# Patient Record
Sex: FEMALE | Race: BLACK OR AFRICAN AMERICAN | Employment: OTHER | URBAN - METROPOLITAN AREA
[De-identification: names, ages, dates, MRNs, and addresses within clinical notes are randomized per-mention and may not be internally consistent; named-entity substitution may affect disease eponyms.]

---

## 2019-11-05 ENCOUNTER — OFFICE VISIT (OUTPATIENT)
Dept: PRIMARY CARE CLINIC | Age: 59
End: 2019-11-05
Payer: MEDICARE

## 2019-11-05 VITALS
DIASTOLIC BLOOD PRESSURE: 78 MMHG | BODY MASS INDEX: 31.25 KG/M2 | WEIGHT: 187.6 LBS | HEIGHT: 65 IN | OXYGEN SATURATION: 100 % | HEART RATE: 64 BPM | SYSTOLIC BLOOD PRESSURE: 138 MMHG

## 2019-11-05 DIAGNOSIS — G43.109 MIGRAINE WITH AURA AND WITHOUT STATUS MIGRAINOSUS, NOT INTRACTABLE: ICD-10-CM

## 2019-11-05 DIAGNOSIS — J30.89 NON-SEASONAL ALLERGIC RHINITIS, UNSPECIFIED TRIGGER: ICD-10-CM

## 2019-11-05 DIAGNOSIS — M51.16 LUMBAR DISC DISEASE WITH RADICULOPATHY: ICD-10-CM

## 2019-11-05 DIAGNOSIS — R63.5 WEIGHT GAIN: Primary | ICD-10-CM

## 2019-11-05 DIAGNOSIS — I10 HYPERTENSION: ICD-10-CM

## 2019-11-05 DIAGNOSIS — F32.5 MAJOR DEPRESSIVE DISORDER WITH SINGLE EPISODE, IN FULL REMISSION (HCC): ICD-10-CM

## 2019-11-05 DIAGNOSIS — I10 ESSENTIAL HYPERTENSION: ICD-10-CM

## 2019-11-05 DIAGNOSIS — Z23 NEED FOR INFLUENZA VACCINATION: ICD-10-CM

## 2019-11-05 DIAGNOSIS — F51.01 PRIMARY INSOMNIA: ICD-10-CM

## 2019-11-05 LAB
A/G RATIO: 2 (ref 1.1–2.2)
ALBUMIN SERPL-MCNC: 4.7 G/DL (ref 3.4–5)
ALP BLD-CCNC: 132 U/L (ref 40–129)
ALT SERPL-CCNC: 156 U/L (ref 10–40)
ANION GAP SERPL CALCULATED.3IONS-SCNC: 16 MMOL/L (ref 3–16)
AST SERPL-CCNC: 96 U/L (ref 15–37)
BACTERIA: ABNORMAL /HPF
BILIRUB SERPL-MCNC: <0.2 MG/DL (ref 0–1)
BILIRUBIN URINE: ABNORMAL
BLOOD, URINE: NEGATIVE
BUN BLDV-MCNC: 16 MG/DL (ref 7–20)
CALCIUM SERPL-MCNC: 9.9 MG/DL (ref 8.3–10.6)
CHLORIDE BLD-SCNC: 100 MMOL/L (ref 99–110)
CHOLESTEROL, TOTAL: 162 MG/DL (ref 0–199)
CLARITY: CLEAR
CO2: 23 MMOL/L (ref 21–32)
COLOR: YELLOW
COMMENT UA: ABNORMAL
CREAT SERPL-MCNC: 0.8 MG/DL (ref 0.6–1.1)
EPITHELIAL CELLS, UA: 6 /HPF (ref 0–5)
GFR AFRICAN AMERICAN: >60
GFR NON-AFRICAN AMERICAN: >60
GLOBULIN: 2.4 G/DL
GLUCOSE BLD-MCNC: 84 MG/DL (ref 70–99)
GLUCOSE URINE: NEGATIVE MG/DL
HCT VFR BLD CALC: 39.6 % (ref 36–48)
HDLC SERPL-MCNC: 87 MG/DL (ref 40–60)
HEMOGLOBIN: 12.6 G/DL (ref 12–16)
HYALINE CASTS: 9 /LPF (ref 0–8)
KETONES, URINE: NEGATIVE MG/DL
LDL CHOLESTEROL CALCULATED: 55 MG/DL
LEUKOCYTE ESTERASE, URINE: ABNORMAL
MCH RBC QN AUTO: 22.9 PG (ref 26–34)
MCHC RBC AUTO-ENTMCNC: 31.7 G/DL (ref 31–36)
MCV RBC AUTO: 72.2 FL (ref 80–100)
MICROSCOPIC EXAMINATION: YES
NITRITE, URINE: NEGATIVE
PDW BLD-RTO: 15.6 % (ref 12.4–15.4)
PH UA: 6.5 (ref 5–8)
PLATELET # BLD: 204 K/UL (ref 135–450)
PMV BLD AUTO: 10.4 FL (ref 5–10.5)
POTASSIUM SERPL-SCNC: 4.5 MMOL/L (ref 3.5–5.1)
PROTEIN UA: NEGATIVE MG/DL
RBC # BLD: 5.49 M/UL (ref 4–5.2)
RBC UA: 3 /HPF (ref 0–4)
SODIUM BLD-SCNC: 139 MMOL/L (ref 136–145)
SPECIFIC GRAVITY UA: 1.02 (ref 1–1.03)
TOTAL PROTEIN: 7.1 G/DL (ref 6.4–8.2)
TRIGL SERPL-MCNC: 101 MG/DL (ref 0–150)
TSH SERPL DL<=0.05 MIU/L-ACNC: 0.96 UIU/ML (ref 0.27–4.2)
URINE TYPE: ABNORMAL
UROBILINOGEN, URINE: 1 E.U./DL
VLDLC SERPL CALC-MCNC: 20 MG/DL
WBC # BLD: 7.4 K/UL (ref 4–11)
WBC UA: 3 /HPF (ref 0–5)
YEAST: PRESENT /HPF

## 2019-11-05 PROCEDURE — 90686 IIV4 VACC NO PRSV 0.5 ML IM: CPT | Performed by: FAMILY MEDICINE

## 2019-11-05 PROCEDURE — 99204 OFFICE O/P NEW MOD 45 MIN: CPT | Performed by: FAMILY MEDICINE

## 2019-11-05 PROCEDURE — G0008 ADMIN INFLUENZA VIRUS VAC: HCPCS | Performed by: FAMILY MEDICINE

## 2019-11-05 RX ORDER — TRAZODONE HYDROCHLORIDE 50 MG/1
50 TABLET ORAL NIGHTLY
Qty: 30 TABLET | Refills: 5 | Status: CANCELLED | OUTPATIENT
Start: 2019-11-05

## 2019-11-05 RX ORDER — ZIPRASIDONE HYDROCHLORIDE 60 MG/1
60 CAPSULE ORAL 2 TIMES DAILY WITH MEALS
Qty: 60 CAPSULE | Refills: 2 | Status: CANCELLED | OUTPATIENT
Start: 2019-11-05

## 2019-11-05 RX ORDER — IBUPROFEN 800 MG/1
TABLET ORAL
Qty: 90 TABLET | Refills: 3 | Status: SHIPPED | OUTPATIENT
Start: 2019-11-05 | End: 2020-03-02

## 2019-11-05 RX ORDER — SPIRONOLACTONE 50 MG/1
TABLET, FILM COATED ORAL
Qty: 60 TABLET | Refills: 5 | Status: SHIPPED | OUTPATIENT
Start: 2019-11-05 | End: 2020-08-19 | Stop reason: SDUPTHER

## 2019-11-05 RX ORDER — SERTRALINE HYDROCHLORIDE 100 MG/1
TABLET, FILM COATED ORAL
Qty: 60 TABLET | Refills: 5 | Status: CANCELLED | OUTPATIENT
Start: 2019-11-05

## 2019-11-05 RX ORDER — QUINAPRIL HCL AND HYDROCHLOROTHIAZIDE 20; 25 MG/1; MG/1
TABLET ORAL
Qty: 30 TABLET | Refills: 5 | Status: SHIPPED | OUTPATIENT
Start: 2019-11-05 | End: 2020-08-30 | Stop reason: SDUPTHER

## 2019-11-05 RX ORDER — TRAZODONE HYDROCHLORIDE 50 MG/1
50 TABLET ORAL NIGHTLY
Qty: 30 TABLET | Refills: 5 | Status: SHIPPED | OUTPATIENT
Start: 2019-11-05 | End: 2020-04-24

## 2019-11-05 RX ORDER — TOPIRAMATE 100 MG/1
100 TABLET, FILM COATED ORAL 2 TIMES DAILY
Qty: 180 TABLET | Refills: 1 | Status: SHIPPED | OUTPATIENT
Start: 2019-11-05 | End: 2021-03-05 | Stop reason: SDUPTHER

## 2019-11-05 RX ORDER — FLUTICASONE PROPIONATE 50 MCG
2 SPRAY, SUSPENSION (ML) NASAL DAILY
Qty: 1 BOTTLE | Refills: 5 | Status: SHIPPED | OUTPATIENT
Start: 2019-11-05 | End: 2021-03-05 | Stop reason: SDUPTHER

## 2019-11-05 ASSESSMENT — PATIENT HEALTH QUESTIONNAIRE - PHQ9
SUM OF ALL RESPONSES TO PHQ QUESTIONS 1-9: 1
2. FEELING DOWN, DEPRESSED OR HOPELESS: 1
1. LITTLE INTEREST OR PLEASURE IN DOING THINGS: 0
SUM OF ALL RESPONSES TO PHQ9 QUESTIONS 1 & 2: 1
SUM OF ALL RESPONSES TO PHQ QUESTIONS 1-9: 1

## 2019-11-05 ASSESSMENT — ENCOUNTER SYMPTOMS
SHORTNESS OF BREATH: 0
ABDOMINAL PAIN: 0
EYE PAIN: 0
SINUS PRESSURE: 0
COUGH: 0
VISUAL CHANGE: 0
CHANGE IN BOWEL HABIT: 0
NAUSEA: 0
FACIAL SWEATING: 0
PHOTOPHOBIA: 0
SORE THROAT: 0
EYE REDNESS: 0
BACK PAIN: 0
SWOLLEN GLANDS: 0
VOMITING: 0
BLURRED VISION: 0
ORTHOPNEA: 0
SCALP TENDERNESS: 0

## 2019-11-06 ENCOUNTER — TELEPHONE (OUTPATIENT)
Dept: FAMILY MEDICINE CLINIC | Age: 59
End: 2019-11-06

## 2019-11-19 ENCOUNTER — OFFICE VISIT (OUTPATIENT)
Dept: PRIMARY CARE CLINIC | Age: 59
End: 2019-11-19
Payer: MEDICARE

## 2019-11-19 VITALS
WEIGHT: 187.6 LBS | HEIGHT: 65 IN | DIASTOLIC BLOOD PRESSURE: 76 MMHG | OXYGEN SATURATION: 100 % | HEART RATE: 63 BPM | SYSTOLIC BLOOD PRESSURE: 121 MMHG | BODY MASS INDEX: 31.25 KG/M2

## 2019-11-19 DIAGNOSIS — Z11.4 SCREENING FOR HIV WITHOUT PRESENCE OF RISK FACTORS: ICD-10-CM

## 2019-11-19 DIAGNOSIS — I10 ESSENTIAL HYPERTENSION: Primary | ICD-10-CM

## 2019-11-19 DIAGNOSIS — I10 ESSENTIAL HYPERTENSION: ICD-10-CM

## 2019-11-19 DIAGNOSIS — Z11.59 NEED FOR HEPATITIS C SCREENING TEST: ICD-10-CM

## 2019-11-19 LAB — HEPATITIS C ANTIBODY INTERPRETATION: NORMAL

## 2019-11-19 PROCEDURE — 99213 OFFICE O/P EST LOW 20 MIN: CPT | Performed by: FAMILY MEDICINE

## 2019-11-19 RX ORDER — QUINAPRIL 10 MG/1
TABLET ORAL
Qty: 30 TABLET | Refills: 1 | Status: SHIPPED | OUTPATIENT
Start: 2019-11-19 | End: 2019-11-19 | Stop reason: SDUPTHER

## 2019-11-19 RX ORDER — HYDROCHLOROTHIAZIDE 12.5 MG/1
CAPSULE, GELATIN COATED ORAL
Refills: 1 | COMMUNITY
Start: 2019-08-28 | End: 2019-11-19 | Stop reason: SDUPTHER

## 2019-11-19 RX ORDER — HYDROCHLOROTHIAZIDE 12.5 MG/1
CAPSULE, GELATIN COATED ORAL
Qty: 30 CAPSULE | Refills: 1 | Status: SHIPPED | OUTPATIENT
Start: 2019-11-19 | End: 2019-11-19 | Stop reason: SDUPTHER

## 2019-11-19 RX ORDER — QUINAPRIL 10 MG/1
TABLET ORAL
Refills: 1 | COMMUNITY
Start: 2019-08-28 | End: 2019-11-19 | Stop reason: SDUPTHER

## 2019-11-19 ASSESSMENT — ENCOUNTER SYMPTOMS
BLOOD IN STOOL: 0
PHOTOPHOBIA: 0
VOMITING: 0
WHEEZING: 0
SINUS PRESSURE: 0
RHINORRHEA: 0
EYE ITCHING: 0
NAUSEA: 0
COUGH: 0
DIARRHEA: 0
CHOKING: 0
RECTAL PAIN: 0
EYE DISCHARGE: 0
EYE PAIN: 0
CHEST TIGHTNESS: 0
BACK PAIN: 0
COLOR CHANGE: 0
TROUBLE SWALLOWING: 0
EYE REDNESS: 0
SORE THROAT: 0
STRIDOR: 0
ABDOMINAL DISTENTION: 0
CONSTIPATION: 0
SHORTNESS OF BREATH: 0
APNEA: 0

## 2019-11-20 LAB
HIV AG/AB: NORMAL
HIV ANTIGEN: NORMAL
HIV-1 ANTIBODY: NORMAL
HIV-2 AB: NORMAL

## 2019-11-20 RX ORDER — HYDROCHLOROTHIAZIDE 12.5 MG/1
CAPSULE, GELATIN COATED ORAL
Qty: 90 CAPSULE | Refills: 1 | Status: SHIPPED | OUTPATIENT
Start: 2019-11-20 | End: 2020-07-24

## 2019-11-20 RX ORDER — QUINAPRIL 10 MG/1
TABLET ORAL
Qty: 90 TABLET | Refills: 1 | Status: SHIPPED | OUTPATIENT
Start: 2019-11-20 | End: 2020-05-04

## 2020-03-02 RX ORDER — IBUPROFEN 800 MG/1
TABLET ORAL
Qty: 90 TABLET | Refills: 3 | Status: SHIPPED | OUTPATIENT
Start: 2020-03-02 | End: 2020-07-30

## 2020-04-24 RX ORDER — TRAZODONE HYDROCHLORIDE 50 MG/1
TABLET ORAL
Qty: 30 TABLET | Refills: 5 | Status: SHIPPED | OUTPATIENT
Start: 2020-04-24 | End: 2020-10-27

## 2020-07-24 RX ORDER — HYDROCHLOROTHIAZIDE 12.5 MG/1
CAPSULE, GELATIN COATED ORAL
Qty: 90 CAPSULE | Refills: 1 | Status: SHIPPED | OUTPATIENT
Start: 2020-07-24 | End: 2020-08-19 | Stop reason: SDUPTHER

## 2020-07-30 RX ORDER — IBUPROFEN 800 MG/1
TABLET ORAL
Qty: 90 TABLET | Refills: 3 | Status: SHIPPED | OUTPATIENT
Start: 2020-07-30 | End: 2020-08-19 | Stop reason: SDUPTHER

## 2020-08-19 ENCOUNTER — OFFICE VISIT (OUTPATIENT)
Dept: PRIMARY CARE CLINIC | Age: 60
End: 2020-08-19
Payer: MEDICARE

## 2020-08-19 VITALS
OXYGEN SATURATION: 98 % | HEART RATE: 65 BPM | DIASTOLIC BLOOD PRESSURE: 71 MMHG | WEIGHT: 192 LBS | HEIGHT: 65 IN | TEMPERATURE: 97.2 F | SYSTOLIC BLOOD PRESSURE: 120 MMHG | BODY MASS INDEX: 31.99 KG/M2

## 2020-08-19 DIAGNOSIS — I10 ESSENTIAL HYPERTENSION: ICD-10-CM

## 2020-08-19 PROBLEM — F32.5 MAJOR DEPRESSIVE DISORDER WITH SINGLE EPISODE, IN FULL REMISSION (HCC): Status: ACTIVE | Noted: 2020-08-19

## 2020-08-19 LAB
A/G RATIO: 2 (ref 1.1–2.2)
ALBUMIN SERPL-MCNC: 4.8 G/DL (ref 3.4–5)
ALP BLD-CCNC: 120 U/L (ref 40–129)
ALT SERPL-CCNC: 91 U/L (ref 10–40)
ANION GAP SERPL CALCULATED.3IONS-SCNC: 15 MMOL/L (ref 3–16)
AST SERPL-CCNC: 54 U/L (ref 15–37)
BILIRUB SERPL-MCNC: <0.2 MG/DL (ref 0–1)
BUN BLDV-MCNC: 18 MG/DL (ref 7–20)
CALCIUM SERPL-MCNC: 10.3 MG/DL (ref 8.3–10.6)
CHLORIDE BLD-SCNC: 99 MMOL/L (ref 99–110)
CO2: 26 MMOL/L (ref 21–32)
CREAT SERPL-MCNC: 1 MG/DL (ref 0.6–1.1)
GFR AFRICAN AMERICAN: >60
GFR NON-AFRICAN AMERICAN: 57
GLOBULIN: 2.4 G/DL
GLUCOSE BLD-MCNC: 86 MG/DL (ref 70–99)
HCT VFR BLD CALC: 38 % (ref 36–48)
HEMOGLOBIN: 12.2 G/DL (ref 12–16)
MCH RBC QN AUTO: 22.9 PG (ref 26–34)
MCHC RBC AUTO-ENTMCNC: 32.2 G/DL (ref 31–36)
MCV RBC AUTO: 71 FL (ref 80–100)
PDW BLD-RTO: 15 % (ref 12.4–15.4)
PLATELET # BLD: 245 K/UL (ref 135–450)
PMV BLD AUTO: 9.7 FL (ref 5–10.5)
POTASSIUM SERPL-SCNC: 3.9 MMOL/L (ref 3.5–5.1)
RBC # BLD: 5.35 M/UL (ref 4–5.2)
SODIUM BLD-SCNC: 140 MMOL/L (ref 136–145)
TOTAL PROTEIN: 7.2 G/DL (ref 6.4–8.2)
WBC # BLD: 10.6 K/UL (ref 4–11)

## 2020-08-19 PROCEDURE — 99214 OFFICE O/P EST MOD 30 MIN: CPT | Performed by: FAMILY MEDICINE

## 2020-08-19 RX ORDER — QUINAPRIL 10 MG/1
10 TABLET ORAL NIGHTLY
Qty: 90 TABLET | Refills: 2 | Status: SHIPPED | OUTPATIENT
Start: 2020-08-19 | End: 2021-02-11

## 2020-08-19 RX ORDER — HYDROCHLOROTHIAZIDE 12.5 MG/1
CAPSULE, GELATIN COATED ORAL
Qty: 90 CAPSULE | Refills: 1 | Status: SHIPPED | OUTPATIENT
Start: 2020-08-19 | End: 2021-03-05 | Stop reason: SDUPTHER

## 2020-08-19 RX ORDER — SERTRALINE HYDROCHLORIDE 100 MG/1
TABLET, FILM COATED ORAL
Qty: 60 TABLET | Refills: 5 | Status: SHIPPED | OUTPATIENT
Start: 2020-08-19 | End: 2021-03-05 | Stop reason: SDUPTHER

## 2020-08-19 RX ORDER — IBUPROFEN 800 MG/1
TABLET ORAL
Qty: 90 TABLET | Refills: 3 | Status: SHIPPED | OUTPATIENT
Start: 2020-08-19 | End: 2021-03-05 | Stop reason: SDUPTHER

## 2020-08-19 RX ORDER — ZOSTER VACCINE RECOMBINANT, ADJUVANTED 50 MCG/0.5
0.5 KIT INTRAMUSCULAR ONCE
Qty: 0.5 ML | Refills: 0 | Status: SHIPPED | OUTPATIENT
Start: 2020-08-19 | End: 2020-08-19

## 2020-08-19 RX ORDER — SPIRONOLACTONE 50 MG/1
TABLET, FILM COATED ORAL
Qty: 60 TABLET | Refills: 5 | Status: SHIPPED | OUTPATIENT
Start: 2020-08-19 | End: 2020-08-30

## 2020-08-19 ASSESSMENT — ENCOUNTER SYMPTOMS
SINUS PRESSURE: 0
RHINORRHEA: 0
CHOKING: 0
PHOTOPHOBIA: 0
APNEA: 0
CONSTIPATION: 0
STRIDOR: 0
ABDOMINAL PAIN: 0
BLOOD IN STOOL: 0
WHEEZING: 0
EYE ITCHING: 0
BOWEL INCONTINENCE: 0
BLURRED VISION: 0
EYE DISCHARGE: 0
VOMITING: 0
COUGH: 0
TROUBLE SWALLOWING: 0
SORE THROAT: 0
SHORTNESS OF BREATH: 0
DIARRHEA: 0
COLOR CHANGE: 0
ABDOMINAL DISTENTION: 0
ORTHOPNEA: 0
EYE PAIN: 0
CHEST TIGHTNESS: 0
BACK PAIN: 0
NAUSEA: 0
RECTAL PAIN: 0
EYE REDNESS: 0

## 2020-08-19 ASSESSMENT — PATIENT HEALTH QUESTIONNAIRE - PHQ9
1. LITTLE INTEREST OR PLEASURE IN DOING THINGS: 0
SUM OF ALL RESPONSES TO PHQ QUESTIONS 1-9: 0
2. FEELING DOWN, DEPRESSED OR HOPELESS: 0
SUM OF ALL RESPONSES TO PHQ9 QUESTIONS 1 & 2: 0
SUM OF ALL RESPONSES TO PHQ QUESTIONS 1-9: 0

## 2020-08-19 NOTE — PATIENT INSTRUCTIONS
instructions adapted under license by [facility] ([facility]). If you have questions about a medical condition or this instruction, always ask your healthcare professional. [address] any warranty or liability for your use of this information.

## 2020-08-30 RX ORDER — SPIRONOLACTONE 50 MG/1
TABLET, FILM COATED ORAL
Qty: 60 TABLET | Refills: 5 | Status: SHIPPED | OUTPATIENT
Start: 2020-08-30 | End: 2021-03-05 | Stop reason: SDUPTHER

## 2020-08-31 DIAGNOSIS — R79.89 ELEVATED LFTS: ICD-10-CM

## 2020-08-31 LAB
HAV IGM SER IA-ACNC: NORMAL
HEPATITIS B CORE IGM ANTIBODY: NORMAL
HEPATITIS B SURFACE ANTIGEN INTERPRETATION: NORMAL
HEPATITIS C ANTIBODY INTERPRETATION: NORMAL

## 2020-10-27 RX ORDER — TRAZODONE HYDROCHLORIDE 50 MG/1
TABLET ORAL
Qty: 30 TABLET | Refills: 5 | Status: SHIPPED | OUTPATIENT
Start: 2020-10-27 | End: 2021-03-05 | Stop reason: SDUPTHER

## 2020-10-30 ENCOUNTER — OFFICE VISIT (OUTPATIENT)
Dept: PRIMARY CARE CLINIC | Age: 60
End: 2020-10-30
Payer: MEDICARE

## 2020-10-30 VITALS
SYSTOLIC BLOOD PRESSURE: 113 MMHG | HEIGHT: 65 IN | BODY MASS INDEX: 31.99 KG/M2 | OXYGEN SATURATION: 99 % | TEMPERATURE: 97 F | HEART RATE: 74 BPM | WEIGHT: 192 LBS | DIASTOLIC BLOOD PRESSURE: 68 MMHG

## 2020-10-30 PROCEDURE — G0008 ADMIN INFLUENZA VIRUS VAC: HCPCS | Performed by: FAMILY MEDICINE

## 2020-10-30 PROCEDURE — 90686 IIV4 VACC NO PRSV 0.5 ML IM: CPT | Performed by: FAMILY MEDICINE

## 2020-10-30 PROCEDURE — G0438 PPPS, INITIAL VISIT: HCPCS | Performed by: FAMILY MEDICINE

## 2020-10-30 RX ORDER — ZOSTER VACCINE RECOMBINANT, ADJUVANTED 50 MCG/0.5
0.5 KIT INTRAMUSCULAR ONCE
Qty: 0.5 ML | Refills: 0 | Status: SHIPPED | OUTPATIENT
Start: 2020-10-30 | End: 2020-10-30

## 2020-10-30 ASSESSMENT — LIFESTYLE VARIABLES
HAS A RELATIVE, FRIEND, DOCTOR, OR ANOTHER HEALTH PROFESSIONAL EXPRESSED CONCERN ABOUT YOUR DRINKING OR SUGGESTED YOU CUT DOWN: 0
HOW OFTEN DURING THE LAST YEAR HAVE YOU FOUND THAT YOU WERE NOT ABLE TO STOP DRINKING ONCE YOU HAD STARTED: 0
HOW MANY STANDARD DRINKS CONTAINING ALCOHOL DO YOU HAVE ON A TYPICAL DAY: 0
AUDIT TOTAL SCORE: 1
HAVE YOU OR SOMEONE ELSE BEEN INJURED AS A RESULT OF YOUR DRINKING: 0
HOW OFTEN DURING THE LAST YEAR HAVE YOU FAILED TO DO WHAT WAS NORMALLY EXPECTED FROM YOU BECAUSE OF DRINKING: 0
HOW OFTEN DO YOU HAVE A DRINK CONTAINING ALCOHOL: 1
HOW OFTEN DURING THE LAST YEAR HAVE YOU NEEDED AN ALCOHOLIC DRINK FIRST THING IN THE MORNING TO GET YOURSELF GOING AFTER A NIGHT OF HEAVY DRINKING: 0
HOW OFTEN DURING THE LAST YEAR HAVE YOU HAD A FEELING OF GUILT OR REMORSE AFTER DRINKING: 0
HOW OFTEN DO YOU HAVE SIX OR MORE DRINKS ON ONE OCCASION: 0
AUDIT-C TOTAL SCORE: 1
HOW OFTEN DURING THE LAST YEAR HAVE YOU BEEN UNABLE TO REMEMBER WHAT HAPPENED THE NIGHT BEFORE BECAUSE YOU HAD BEEN DRINKING: 0

## 2020-10-30 ASSESSMENT — PATIENT HEALTH QUESTIONNAIRE - PHQ9
SUM OF ALL RESPONSES TO PHQ QUESTIONS 1-9: 0
SUM OF ALL RESPONSES TO PHQ QUESTIONS 1-9: 0
1. LITTLE INTEREST OR PLEASURE IN DOING THINGS: 0
2. FEELING DOWN, DEPRESSED OR HOPELESS: 0
SUM OF ALL RESPONSES TO PHQ QUESTIONS 1-9: 0
SUM OF ALL RESPONSES TO PHQ9 QUESTIONS 1 & 2: 0

## 2020-10-30 NOTE — PROGRESS NOTES
Medicare Annual Wellness Visit  Name: Carlos Samano Date: 10/30/2020   MRN: <[id number]> Sex: Female   Age: 64 y.o. Ethnicity: Non-/Non    : 1960 Race: Claudeen Bonds is here for Medicare AWV    Screenings for behavioral, psychosocial and functional/safety risks, and cognitive dysfunction are all negative except as indicated below. These results, as well as other patient data from the [address] form, are documented in Flowsheets linked to this Encounter. No Known Allergies    Prior to Visit Medications    Medication Sig Taking? Authorizing Provider   traZODone (DESYREL) 50 MG tablet TAKE 1 TABLET BY MOUTH EVERY NIGHT Yes Sandrine Nguyen MD   spironolactone (ALDACTONE) 50 MG tablet TAKE 1 TABLET BY MOUTH TWICE DAILY Yes Sandrine Nguyen MD   quinapril (ACCUPRIL) 10 MG tablet Take 1 tablet by mouth nightly Yes Sandrine Nguyen MD   hydroCHLOROthiazide (MICROZIDE) 12.5 MG capsule TAKE 1 CAPSULE Tomer Snide Yes Sandrine Nguyen MD   sertraline (ZOLOFT) 100 MG tablet TAKE TWO TABLETS BY MOUTH DAILY Yes Sandrine Nguyen MD   ibuprofen (ADVIL;MOTRIN) 800 MG tablet TAKE 1 TABLET BY MOUTH THREE TIMES DAILY AS NEEDED FOR PAIN Yes Sandrine Nguyen MD   fluticasone (FLONASE) 50 MCG/ACT nasal spray 2 sprays by Nasal route daily Yes Sandrine Nguyen MD   topiramate (TOPAMAX) 100 MG tablet Take 1 tablet by mouth 2 times daily Yes Sandrine Nguyen MD   ziprasidone (GEODON) 60 MG capsule  Yes Historical Provider, MD   loratadine (CLARITIN) 10 MG tablet Take 1 tablet by mouth daily.  Yes Sandrine Nguyen MD       Past Medical History:   Diagnosis Date    Anxiety     Chronic back pain     injured back in  at work    Depression     Foot arch pain     is seen by a podiatrist    Headache(784.0)     Hearing loss of left ear     Hypertension     Osteoarthritis        Past Surgical History:   Procedure Laterality Date   Janet Curtis  at [address] benign    WISDOM TOOTH EXTRACTION         Family History   Problem Relation Age of Onset    Diabetes Mother     High Blood Pressure Mother     High Cholesterol Mother     Diabetes Sister     Heart Disease Sister     High Blood Pressure Sister     Substance Abuse Sister     Diabetes Brother     High Blood Pressure Brother     Heart Disease Brother     Substance Abuse Brother     Rheum Arthritis Neg Hx     Osteoarthritis Neg Hx     Asthma Neg Hx     Breast Cancer Neg Hx     Cancer Neg Hx     Heart Failure Neg Hx     Hypertension Neg Hx     Migraines Neg Hx     Ovarian Cancer Neg Hx     Rashes/Skin Problems Neg Hx     Seizures Neg Hx     Stroke Neg Hx     Thyroid Disease Neg Hx        CareTeam (Including outside providers/suppliers regularly involved in providing care):   Patient Care Team:  Jessica Correa MD as PCP - General (Family Medicine)  Jessica Correa MD as PCP - [facility] Empaneled Provider    Wt Readings from Last 3 Encounters:   10/30/20 192 lb (87.1 kg)   08/19/20 192 lb (87.1 kg)   11/19/19 187 lb 9.6 oz (85.1 kg)     Vitals:    10/30/20 1017   BP: 113/68   Pulse: 74   Temp: 97 °F (36.1 °C)   TempSrc: Oral   SpO2: 99%   Weight: 192 lb (87.1 kg)   Height: 5' 5\" (1.651 m)     Body mass index is 31.95 kg/m². Based upon direct observation of the patient, evaluation of cognition reveals recent and remote memory intact. Patient's complete Health Risk Assessment and screening values have been reviewed and are found in Flowsheets. The following problems were reviewed today and where indicated follow up appointments were made and/or referrals ordered. Positive Risk Factor Screenings with Interventions:     General Health and ACP:  General  In general, how would you say your health is?: (!) Poor  In the past 7 days, have you experienced any of the following?  New or Increased Pain, New or Increased Fatigue, Loneliness, Social Isolation, Stress or Anger?: (!) Loneliness, Stress, Anger  Do you get the social and emotional support that you need?: Yes  Do you have a Living Will?: (!) No  Advance Directives     Power of  Living Will ACP-Advance Directive ACP-Power of     Not on File Filed on 03/13/13 [address] [facility] Risk Interventions:  · she has chronic lower back pain    Has a good social support system  geodon helps, exercise helps her  Has mood swings at times  No suicidal ideation    Information advanced planning/living will given    Health Habits/Nutrition:  Health Habits/Nutrition  Do you exercise for at least 20 minutes 2-3 times per week?: Yes  Have you lost any weight without trying in the past 3 months?: No  Do you eat fewer than 2 meals per day?: No  Have you seen a dentist within the past year?: (!) No  Body mass index: (!) 31.95  Health Habits/Nutrition Interventions:  · Inadequate physical activity:  patient agrees to exercise for at least 150 minutes/week  · Dental exam overdue:  patient encouraged to make appointment with his/her dentist    Hearing/Vision:  No exam data present  Hearing/Vision  Do you or your family notice any trouble with your hearing?: (!) Yes  Do you have difficulty driving, watching TV, or doing any of your daily activities because of your eyesight?: (!) Yes  Have you had an eye exam within the past year?: (!) No  Hearing/Vision Interventions:  · Hearing concerns:  previous testing , has otc hearing appliance which works  · Vision concerns:  she sees eye specialist, near sighted    Personalized Preventive Plan   Current Health Maintenance Status  Immunization History   Administered Date(s) Administered    Influenza, Intradermal, Preservative free 12/19/2012, 10/02/2013, 12/10/2014, 11/11/2015    Influenza, Quadv, IM, PF (6 mo and older Fluzone, Flulaval, Fluarix, and 3 yrs and older Afluria) 11/05/2019    Pneumococcal Polysaccharide ([id number]) 12/19/2012    Tdap (Boostrix, Adacel) 12/19/2012        Health Maintenance   Topic Date Due    Shingles Vaccine (1 of 2) 09/25/2012    Cervical cancer screen  11/24/2018    Annual Wellness Visit (AWV)  08/18/2019    Flu vaccine (1) 09/01/2020    Potassium monitoring  08/19/2021    Creatinine monitoring  08/19/2021    Breast cancer screen  10/01/2022    DTaP/Tdap/Td vaccine (2 - Td) 12/19/2022    Colon cancer screen colonoscopy  06/13/2023    Lipid screen  11/05/2024    Hepatitis C screen  Completed    HIV screen  Completed    Hepatitis A vaccine  Aged Out    Hepatitis B vaccine  Aged Out    Hib vaccine  Aged Out    Meningococcal (ACWY) vaccine  Aged Out    Pneumococcal 0-64 years Vaccine  Aged Out     Recommendations for [place] Due: see orders and patient instructions/AVS.  . Recommended screening schedule for the next 5-10 years is provided to the patient in written form: see Patient Instructions/AVS.    There are no diagnoses linked to this encounter.

## 2020-10-30 NOTE — PATIENT INSTRUCTIONS
Personalized Preventive Plan for Janeth Guillen - 10/30/2020  Medicare offers a range of preventive health benefits. Some of the tests and screenings are paid in full while other may be subject to a deductible, co-insurance, and/or copay. Some of these benefits include a comprehensive review of your medical history including lifestyle, illnesses that may run in your family, and various assessments and screenings as appropriate. After reviewing your medical record and screening and assessments performed today your provider may have ordered immunizations, labs, imaging, and/or referrals for you. A list of these orders (if applicable) as well as your Preventive Care list are included within your After Visit Summary for your review. Other Preventive Recommendations:    · A preventive eye exam performed by an eye specialist is recommended every 1-2 years to screen for glaucoma; cataracts, macular degeneration, and other eye disorders. · A preventive dental visit is recommended every 6 months. · Try to get at least 150 minutes of exercise per week or 10,000 steps per day on a pedometer . · Order or download the FREE \"Exercise & Physical Activity: Your Everyday Guide\" from [employer]. Call 3-757.592.5433 or search [employer] online. · You need 7354-2901 mg of calcium and 7471-9087 IU of vitamin D per day. It is possible to meet your calcium requirement with diet alone, but a vitamin D supplement is usually necessary to meet this goal.  · When exposed to the sun, use a sunscreen that protects against both UVA and UVB radiation with an SPF of 30 or greater. Reapply every 2 to 3 hours or after sweating, drying off with a towel, or swimming. · Always wear a seat belt when traveling in a car. Always wear a helmet when riding a bicycle or motorcycle.

## 2020-11-25 ENCOUNTER — OFFICE VISIT (OUTPATIENT)
Dept: PRIMARY CARE CLINIC | Age: 60
End: 2020-11-25
Payer: MEDICARE

## 2020-11-25 VITALS
DIASTOLIC BLOOD PRESSURE: 74 MMHG | BODY MASS INDEX: 32.62 KG/M2 | OXYGEN SATURATION: 99 % | HEART RATE: 64 BPM | TEMPERATURE: 97.1 F | SYSTOLIC BLOOD PRESSURE: 118 MMHG | WEIGHT: 196 LBS

## 2020-11-25 PROCEDURE — 99213 OFFICE O/P EST LOW 20 MIN: CPT | Performed by: FAMILY MEDICINE

## 2020-11-25 ASSESSMENT — ENCOUNTER SYMPTOMS
SHORTNESS OF BREATH: 0
BLURRED VISION: 0
BACK PAIN: 1
BOWEL INCONTINENCE: 0
ABDOMINAL PAIN: 0
ORTHOPNEA: 0

## 2020-11-25 NOTE — PROGRESS NOTES
Subjective:      Patient ID: [id number] Gurrola is a 64 y.o. female. 3 month fu  Hypertension   This is a chronic problem. The current episode started more than 1 year ago. The problem is controlled. Pertinent negatives include no anxiety, blurred vision, chest pain, headaches, neck pain, orthopnea, palpitations, peripheral edema, PND, shortness of breath or sweats. Past treatments include ACE inhibitors and diuretics. The current treatment provides significant improvement. There are no compliance problems. There is no history of angina, kidney disease, CAD/MI, CVA, heart failure, left ventricular hypertrophy, PVD or retinopathy. There is no history of chronic renal disease, coarctation of the aorta, hyperaldosteronism, hypercortisolism, hyperparathyroidism, a hypertension causing med, pheochromocytoma, renovascular disease, sleep apnea or a thyroid problem. Back Pain   This is a chronic problem. The current episode started more than 1 year ago. The pain is present in the lumbar spine. The pain is at a severity of 3/10. The pain is mild. The symptoms are aggravated by bending. Pertinent negatives include no abdominal pain, bladder incontinence, bowel incontinence, chest pain, dysuria, fever, headaches, leg pain, numbness, paresis, pelvic pain, perianal numbness, tingling, weakness or weight loss. She has tried NSAIDs for the symptoms. The treatment provided moderate relief. 63 y/o female fu for htn and low back pain(takes ibuprofen which helps)    Hypertension  See hpi    Low back pain  With rad to R leg  See hpi  Injury 20101    She has depression  No suicidal ideation  Enjoys life    Review of Systems   Constitutional: Negative for fever and weight loss. Eyes: Negative for blurred vision. Respiratory: Negative for shortness of breath. Cardiovascular: Negative for chest pain, palpitations, orthopnea and PND. Gastrointestinal: Negative for abdominal pain and bowel incontinence.    Genitourinary: Negative for bladder incontinence, dysuria and pelvic pain. Musculoskeletal: Positive for back pain. Negative for neck pain. Neurological: Negative for tingling, weakness, numbness and headaches. Objective:   Physical Exam  Constitutional:       General: She is not in acute distress. Appearance: She is well-developed. She is not diaphoretic. HENT:      Head: Normocephalic and atraumatic. Right Ear: External ear normal.      Left Ear: External ear normal.      Nose: Nose normal.      Mouth/Throat:      Pharynx: No oropharyngeal exudate. Eyes:      General: No scleral icterus. Left eye: No discharge. Conjunctiva/sclera: Conjunctivae normal.      Pupils: Pupils are equal, round, and reactive to light. Neck:      Musculoskeletal: Normal range of motion and neck supple. Thyroid: No thyromegaly. Vascular: No JVD. Trachea: No tracheal deviation. Cardiovascular:      Rate and Rhythm: Normal rate and regular rhythm. Heart sounds: Normal heart sounds. No murmur. No friction rub. No gallop. Pulmonary:      Effort: Pulmonary effort is normal. No respiratory distress. Breath sounds: Normal breath sounds. No stridor. No wheezing or rales. Chest:      Chest wall: No tenderness. Abdominal:      General: Bowel sounds are normal. There is no distension. Palpations: Abdomen is soft. There is no mass. Tenderness: There is no abdominal tenderness. There is no guarding or rebound. Musculoskeletal: Normal range of motion. General: No tenderness. Lymphadenopathy:      Cervical: No cervical adenopathy. Skin:     General: Skin is warm and dry. Coloration: Skin is not pale. Findings: No erythema or rash. Neurological:      Mental Status: She is alert and oriented to person, place, and time. Cranial Nerves: No cranial nerve deficit. Coordination: Coordination normal.      Deep Tendon Reflexes: Reflexes are normal and symmetric.  Reflexes normal.   Psychiatric:         Behavior: Behavior normal.         Thought Content: Thought content normal.         Judgment: Judgment normal.     .  Lab Results   Component Value Date    CREATININE 1.0 08/19/2020    BUN 18 08/19/2020     08/19/2020    K 3.9 08/19/2020    CL 99 08/19/2020    CO2 26 08/19/2020       Lab Results   Component Value Date    CHOL 162 11/05/2019    CHOL 153 01/28/2014    CHOL 170 12/19/2012     Lab Results   Component Value Date    TRIG 101 11/05/2019    TRIG 93 01/28/2014    TRIG 73 12/19/2012     Lab Results   Component Value Date    HDL 87 (H) 11/05/2019    HDL 63 01/28/2014    HDL 67 (H) 12/19/2012     Lab Results   Component Value Date    LDLCALC 55 11/05/2019    LDLCALC 71 01/28/2014    LDLCALC 89 12/19/2012     Lab Results   Component Value Date    LABVLDL 20 11/05/2019    LABVLDL 19 01/28/2014    LABVLDL 15 12/19/2012     No results found for: CHOLHDLRATIO\  Assessment:      1. Essential hypertension  BP is at goal <140/90. Will continue current medication and low salt diet. Has  had recent renal function testing    2. Chronic midline low back pain without sciatica  Ibuprofen controls symptoms    3.  Major depressive disorder with single episode, in full remission ([address]  Good outlook on life  On geodon  zoloft  Follow up with psychi            Plan:              Sukhjinder Newlel MD

## 2021-02-11 DIAGNOSIS — I10 ESSENTIAL HYPERTENSION: ICD-10-CM

## 2021-02-11 RX ORDER — QUINAPRIL 10 MG/1
TABLET ORAL
Qty: 90 TABLET | Refills: 1 | Status: SHIPPED | OUTPATIENT
Start: 2021-02-11 | End: 2021-03-05 | Stop reason: SDUPTHER

## 2021-03-05 ENCOUNTER — OFFICE VISIT (OUTPATIENT)
Dept: PRIMARY CARE CLINIC | Age: 61
End: 2021-03-05
Payer: MEDICARE

## 2021-03-05 VITALS
BODY MASS INDEX: 32.68 KG/M2 | TEMPERATURE: 97.2 F | DIASTOLIC BLOOD PRESSURE: 83 MMHG | HEART RATE: 75 BPM | SYSTOLIC BLOOD PRESSURE: 122 MMHG | OXYGEN SATURATION: 98 % | RESPIRATION RATE: 18 BRPM | WEIGHT: 196.4 LBS

## 2021-03-05 DIAGNOSIS — J30.2 SEASONAL ALLERGIC RHINITIS, UNSPECIFIED TRIGGER: Primary | ICD-10-CM

## 2021-03-05 DIAGNOSIS — I10 ESSENTIAL HYPERTENSION: ICD-10-CM

## 2021-03-05 DIAGNOSIS — J30.2 SEASONAL ALLERGIC RHINITIS, UNSPECIFIED TRIGGER: ICD-10-CM

## 2021-03-05 DIAGNOSIS — G89.29 CHRONIC BILATERAL LOW BACK PAIN, UNSPECIFIED WHETHER SCIATICA PRESENT: ICD-10-CM

## 2021-03-05 DIAGNOSIS — F51.01 PRIMARY INSOMNIA: ICD-10-CM

## 2021-03-05 DIAGNOSIS — F32.5 MAJOR DEPRESSIVE DISORDER WITH SINGLE EPISODE, IN FULL REMISSION (HCC): ICD-10-CM

## 2021-03-05 DIAGNOSIS — G43.109 MIGRAINE WITH AURA AND WITHOUT STATUS MIGRAINOSUS, NOT INTRACTABLE: ICD-10-CM

## 2021-03-05 DIAGNOSIS — M54.50 CHRONIC BILATERAL LOW BACK PAIN, UNSPECIFIED WHETHER SCIATICA PRESENT: ICD-10-CM

## 2021-03-05 PROCEDURE — 99214 OFFICE O/P EST MOD 30 MIN: CPT | Performed by: FAMILY MEDICINE

## 2021-03-05 RX ORDER — IBUPROFEN 800 MG/1
800 TABLET ORAL EVERY 8 HOURS PRN
Qty: 270 TABLET | Refills: 1 | Status: SHIPPED | OUTPATIENT
Start: 2021-03-05 | End: 2021-09-20

## 2021-03-05 RX ORDER — QUINAPRIL 10 MG/1
10 TABLET ORAL NIGHTLY
Qty: 90 TABLET | Refills: 1 | Status: SHIPPED | OUTPATIENT
Start: 2021-03-05 | End: 2021-06-09 | Stop reason: SDUPTHER

## 2021-03-05 RX ORDER — SPIRONOLACTONE 50 MG/1
50 TABLET, FILM COATED ORAL 2 TIMES DAILY
Qty: 180 TABLET | Refills: 1 | Status: SHIPPED | OUTPATIENT
Start: 2021-03-05 | End: 2021-06-09 | Stop reason: SDUPTHER

## 2021-03-05 RX ORDER — TRAZODONE HYDROCHLORIDE 50 MG/1
50 TABLET ORAL NIGHTLY
Qty: 90 TABLET | Refills: 1 | Status: SHIPPED | OUTPATIENT
Start: 2021-03-05 | End: 2021-09-20

## 2021-03-05 RX ORDER — FLUTICASONE PROPIONATE 50 MCG
2 SPRAY, SUSPENSION (ML) NASAL DAILY
Qty: 1 BOTTLE | Refills: 5 | Status: SHIPPED | OUTPATIENT
Start: 2021-03-05 | End: 2021-03-05

## 2021-03-05 RX ORDER — HYDROCHLOROTHIAZIDE 12.5 MG/1
12.5 CAPSULE, GELATIN COATED ORAL EVERY MORNING
Qty: 90 CAPSULE | Refills: 1 | Status: SHIPPED | OUTPATIENT
Start: 2021-03-05 | End: 2021-06-09 | Stop reason: SDUPTHER

## 2021-03-05 RX ORDER — TOPIRAMATE 100 MG/1
100 TABLET, FILM COATED ORAL 2 TIMES DAILY
Qty: 180 TABLET | Refills: 1 | Status: SHIPPED | OUTPATIENT
Start: 2021-03-05 | End: 2021-05-21 | Stop reason: SDUPTHER

## 2021-03-05 RX ORDER — LORATADINE 10 MG/1
10 TABLET ORAL DAILY
Qty: 90 TABLET | Refills: 1 | Status: SHIPPED | OUTPATIENT
Start: 2021-03-05 | End: 2021-06-09

## 2021-03-05 RX ORDER — SERTRALINE HYDROCHLORIDE 100 MG/1
200 TABLET, FILM COATED ORAL DAILY
Qty: 180 TABLET | Refills: 1 | Status: SHIPPED | OUTPATIENT
Start: 2021-03-05 | End: 2021-09-20

## 2021-03-05 NOTE — PROGRESS NOTES
63 y/o female known HTN, seasonal allergies, chronic low back pain, migraines,  Depression and insomnia who is follow up today    Needs meds refill    Hypertension  No chest pain, headache, sob, leg edema, stroke, kidney disease     Allergic Rhinitis: Anival Ochoa is here for evaluation of possible allergic rhinitis and conjunctivitis. Patient's symptoms include clear rhinorrhea, cough, itchy eyes, itchy nose, sneezing, swelling of eyes and watery eyes. These symptoms are perennial with seasonal exacerbation. Current triggers include exposure to no known precipitant. The patient has been suffering from these symptoms for approximately 3 months. The patient has tried prescription nasal sprays with good relief of symptoms. Immunotherapy has never been tried. The patient has never had nasal polyps. The patient has no history of asthma. The patient does not suffer from frequent sinopulmonary infections. The patient has not had sinus surgery in the past. The patient has no history of eczema.       Chronic low back pain  Improved  No bladder/bowel issues    Depression  Enjoys life  No suicidal ideatin    Migraines less than one per week  Controlled    Primary insomnia  good sleep hygiene      No Known Allergies  Current Outpatient Medications   Medication Sig Dispense Refill    fluticasone (FLONASE) 50 MCG/ACT nasal spray 2 sprays by Nasal route daily 1 Bottle 5    hydroCHLOROthiazide (MICROZIDE) 12.5 MG capsule Take 1 capsule by mouth every morning 90 capsule 1    ibuprofen (ADVIL;MOTRIN) 800 MG tablet Take 1 tablet by mouth every 8 hours as needed for Pain 270 tablet 1    loratadine (CLARITIN) 10 MG tablet Take 1 tablet by mouth daily 90 tablet 1    quinapril (ACCUPRIL) 10 MG tablet Take 1 tablet by mouth nightly 90 tablet 1    sertraline (ZOLOFT) 100 MG tablet Take 2 tablets by mouth daily 180 tablet 1    spironolactone (ALDACTONE) 50 MG tablet Take 1 tablet by mouth 2 times daily 180 tablet 1    Deep Tendon Reflexes: Reflexes are normal and symmetric. Reflexes normal.   Psychiatric:         Behavior: Behavior normal.         Thought Content: Thought content normal.         Judgment: Judgment normal.       1. Essential hypertension  BP is at goal <140/90. Will continue current medication and low salt diet. Has not had recent renal function testing  Ck labs today  - hydroCHLOROthiazide (MICROZIDE) 12.5 MG capsule; Take 1 capsule by mouth every morning  Dispense: 90 capsule; Refill: 1  - quinapril (ACCUPRIL) 10 MG tablet; Take 1 tablet by mouth nightly  Dispense: 90 tablet; Refill: 1  - spironolactone (ALDACTONE) 50 MG tablet; Take 1 tablet by mouth 2 times daily  Dispense: 180 tablet; Refill: 1  - CBC; Future  - Comprehensive Metabolic Panel; Future  - Urinalysis; Future  - TSH without Reflex  - Lipid Panel    2. Non-seasonal allergic rhinitis, unspecified trigger  sxs improved  See hpi  - fluticasone (FLONASE) 50 MCG/ACT nasal spray; 2 sprays by Nasal route daily  Dispense: 1 Bottle; Refill: 5    3. Allergic rhinitis  She hpi  sxs improved  - loratadine (CLARITIN) 10 MG tablet; Take 1 tablet by mouth daily  Dispense: 90 tablet; Refill: 1    4. Chronic bilateral low back pain, unspecified whether sciatica present  Improved  Exercise  Cont. - ibuprofen (ADVIL;MOTRIN) 800 MG tablet; Take 1 tablet by mouth every 8 hours as needed for Pain  Dispense: 270 tablet; Refill: 1    5. Major depressive disorder with single episode, in full remission ([facility] [address]  No suicidal ideation  Cont ssri  - sertraline (ZOLOFT) 100 MG tablet; Take 2 tablets by mouth daily  Dispense: 180 tablet; Refill: 1    6. Migraine with aura and without status migrainosus, not intractable  Stable with  - topiramate (TOPAMAX) 100 MG tablet; Take 1 tablet by mouth 2 times daily  Dispense: 180 tablet; Refill: 1    7. Primary insomnia  Stable  Cont. with  - traZODone (DESYREL) 50 MG tablet; Take 1 tablet by mouth nightly  Dispense: 90 tablet;  Refill: 1

## 2021-03-07 RX ORDER — FLUTICASONE PROPIONATE 50 MCG
SPRAY, SUSPENSION (ML) NASAL
Qty: 48 G | Refills: 1 | Status: SHIPPED | OUTPATIENT
Start: 2021-03-07 | End: 2021-12-13 | Stop reason: SDUPTHER

## 2021-03-08 DIAGNOSIS — I10 ESSENTIAL HYPERTENSION: ICD-10-CM

## 2021-03-08 LAB
A/G RATIO: 1.7 (ref 1.1–2.2)
ALBUMIN SERPL-MCNC: 4.7 G/DL (ref 3.4–5)
ALP BLD-CCNC: 113 U/L (ref 40–129)
ALT SERPL-CCNC: 47 U/L (ref 10–40)
ANION GAP SERPL CALCULATED.3IONS-SCNC: 12 MMOL/L (ref 3–16)
AST SERPL-CCNC: 26 U/L (ref 15–37)
BILIRUB SERPL-MCNC: 0.3 MG/DL (ref 0–1)
BUN BLDV-MCNC: 18 MG/DL (ref 7–20)
CALCIUM SERPL-MCNC: 10.7 MG/DL (ref 8.3–10.6)
CHLORIDE BLD-SCNC: 100 MMOL/L (ref 99–110)
CHOLESTEROL, TOTAL: 186 MG/DL (ref 0–199)
CO2: 27 MMOL/L (ref 21–32)
CREAT SERPL-MCNC: 0.7 MG/DL (ref 0.6–1.1)
GFR AFRICAN AMERICAN: >60
GFR NON-AFRICAN AMERICAN: >60
GLOBULIN: 2.7 G/DL
GLUCOSE BLD-MCNC: 83 MG/DL (ref 70–99)
HCT VFR BLD CALC: 39.4 % (ref 36–48)
HDLC SERPL-MCNC: 61 MG/DL (ref 40–60)
HEMOGLOBIN: 12.6 G/DL (ref 12–16)
LDL CHOLESTEROL CALCULATED: 96 MG/DL
MCH RBC QN AUTO: 22.5 PG (ref 26–34)
MCHC RBC AUTO-ENTMCNC: 31.9 G/DL (ref 31–36)
MCV RBC AUTO: 70.5 FL (ref 80–100)
PDW BLD-RTO: 15.6 % (ref 12.4–15.4)
PLATELET # BLD: 277 K/UL (ref 135–450)
PMV BLD AUTO: 10.1 FL (ref 5–10.5)
POTASSIUM SERPL-SCNC: 4.4 MMOL/L (ref 3.5–5.1)
RBC # BLD: 5.58 M/UL (ref 4–5.2)
SODIUM BLD-SCNC: 139 MMOL/L (ref 136–145)
TOTAL PROTEIN: 7.4 G/DL (ref 6.4–8.2)
TRIGL SERPL-MCNC: 147 MG/DL (ref 0–150)
TSH SERPL DL<=0.05 MIU/L-ACNC: 1.97 UIU/ML (ref 0.27–4.2)
VLDLC SERPL CALC-MCNC: 29 MG/DL
WBC # BLD: 9.2 K/UL (ref 4–11)

## 2021-03-15 ENCOUNTER — IMMUNIZATION (OUTPATIENT)
Dept: PRIMARY CARE CLINIC | Age: 61
End: 2021-03-15
Payer: MEDICARE

## 2021-03-15 PROCEDURE — 0001A COVID-19, PFIZER VACCINE 30MCG/0.3ML DOSE: CPT | Performed by: FAMILY MEDICINE

## 2021-03-15 PROCEDURE — 91300 COVID-19, PFIZER VACCINE 30MCG/0.3ML DOSE: CPT | Performed by: FAMILY MEDICINE

## 2021-04-12 ENCOUNTER — IMMUNIZATION (OUTPATIENT)
Dept: PRIMARY CARE CLINIC | Age: 61
End: 2021-04-12
Payer: MEDICARE

## 2021-04-12 PROCEDURE — 91300 COVID-19, PFIZER VACCINE 30MCG/0.3ML DOSE: CPT | Performed by: FAMILY MEDICINE

## 2021-04-12 PROCEDURE — 0002A COVID-19, PFIZER VACCINE 30MCG/0.3ML DOSE: CPT | Performed by: FAMILY MEDICINE

## 2021-05-12 ENCOUNTER — TELEPHONE (OUTPATIENT)
Dept: PRIMARY CARE CLINIC | Age: 61
End: 2021-05-12

## 2021-05-12 NOTE — TELEPHONE ENCOUNTER
[facility]:  Call: Tera Montesinos: [phone]    Med request:   Pt has requested brand name Topomax which requires a PA(pt is requesting this) . pls return a call. thank you!

## 2021-05-17 NOTE — TELEPHONE ENCOUNTER
PA submitted via CMM for Topiramate 100MG tablets. Key: [id number]    Response on CMM: Drug is covered by current benefit plan. No further PA activity needed.

## 2021-05-18 ENCOUNTER — TELEPHONE (OUTPATIENT)
Dept: PRIMARY CARE CLINIC | Age: 61
End: 2021-05-18

## 2021-05-18 NOTE — TELEPHONE ENCOUNTER
Patient is requesting refill of branded Topamax  Instead of generic which she has been taking for  2 years duration.  The generic does not relieve  Her migraine headaches but the branded product  Has always helped her    She has taken ibuprofen which does not help    Request refill of branded Topamax

## 2021-05-21 DIAGNOSIS — G43.109 MIGRAINE WITH AURA AND WITHOUT STATUS MIGRAINOSUS, NOT INTRACTABLE: ICD-10-CM

## 2021-05-21 RX ORDER — TOPIRAMATE 100 MG/1
100 TABLET, FILM COATED ORAL 2 TIMES DAILY
Qty: 180 TABLET | Refills: 1 | Status: SHIPPED | OUTPATIENT
Start: 2021-05-21 | End: 2021-06-09 | Stop reason: SDUPTHER

## 2021-05-21 RX ORDER — TOPIRAMATE 100 MG
100 TABLET ORAL 2 TIMES DAILY
Qty: 180 TABLET | Refills: 1 | Status: SHIPPED | OUTPATIENT
Start: 2021-05-21 | End: 2021-05-21

## 2021-06-09 ENCOUNTER — OFFICE VISIT (OUTPATIENT)
Dept: PRIMARY CARE CLINIC | Age: 61
End: 2021-06-09
Payer: MEDICARE

## 2021-06-09 VITALS
BODY MASS INDEX: 30.95 KG/M2 | DIASTOLIC BLOOD PRESSURE: 76 MMHG | OXYGEN SATURATION: 99 % | SYSTOLIC BLOOD PRESSURE: 110 MMHG | WEIGHT: 186 LBS | HEART RATE: 68 BPM

## 2021-06-09 DIAGNOSIS — J01.10 ACUTE NON-RECURRENT FRONTAL SINUSITIS: Primary | ICD-10-CM

## 2021-06-09 DIAGNOSIS — G43.109 MIGRAINE WITH AURA AND WITHOUT STATUS MIGRAINOSUS, NOT INTRACTABLE: ICD-10-CM

## 2021-06-09 DIAGNOSIS — G43.009 MIGRAINE WITHOUT AURA AND WITHOUT STATUS MIGRAINOSUS, NOT INTRACTABLE: ICD-10-CM

## 2021-06-09 DIAGNOSIS — I10 ESSENTIAL HYPERTENSION: ICD-10-CM

## 2021-06-09 PROCEDURE — 99214 OFFICE O/P EST MOD 30 MIN: CPT | Performed by: FAMILY MEDICINE

## 2021-06-09 RX ORDER — AZITHROMYCIN 250 MG/1
TABLET, FILM COATED ORAL
Qty: 1 PACKET | Refills: 0 | Status: SHIPPED | OUTPATIENT
Start: 2021-06-09 | End: 2021-12-13 | Stop reason: SDUPTHER

## 2021-06-09 RX ORDER — QUINAPRIL 10 MG/1
10 TABLET ORAL NIGHTLY
Qty: 90 TABLET | Refills: 1 | Status: SHIPPED | OUTPATIENT
Start: 2021-06-09 | End: 2021-12-13 | Stop reason: SDUPTHER

## 2021-06-09 RX ORDER — TOPIRAMATE 100 MG/1
100 TABLET, FILM COATED ORAL 2 TIMES DAILY
Qty: 180 TABLET | Refills: 1 | Status: SHIPPED | OUTPATIENT
Start: 2021-06-09 | End: 2021-06-24

## 2021-06-09 RX ORDER — SPIRONOLACTONE 50 MG/1
50 TABLET, FILM COATED ORAL 2 TIMES DAILY
Qty: 180 TABLET | Refills: 1 | Status: SHIPPED | OUTPATIENT
Start: 2021-06-09 | End: 2021-12-03

## 2021-06-09 RX ORDER — LORATADINE 10 MG/1
10 TABLET ORAL DAILY
Qty: 30 TABLET | Refills: 1 | Status: SHIPPED | OUTPATIENT
Start: 2021-06-09 | End: 2021-10-05

## 2021-06-09 RX ORDER — HYDROCHLOROTHIAZIDE 12.5 MG/1
12.5 CAPSULE, GELATIN COATED ORAL EVERY MORNING
Qty: 90 CAPSULE | Refills: 1 | Status: SHIPPED | OUTPATIENT
Start: 2021-06-09 | End: 2021-11-03

## 2021-06-09 SDOH — ECONOMIC STABILITY: FOOD INSECURITY: WITHIN THE PAST 12 MONTHS, YOU WORRIED THAT YOUR FOOD WOULD RUN OUT BEFORE YOU GOT MONEY TO BUY MORE.: NEVER TRUE

## 2021-06-09 SDOH — ECONOMIC STABILITY: FOOD INSECURITY: WITHIN THE PAST 12 MONTHS, THE FOOD YOU BOUGHT JUST DIDN'T LAST AND YOU DIDN'T HAVE MONEY TO GET MORE.: NEVER TRUE

## 2021-06-09 ASSESSMENT — SOCIAL DETERMINANTS OF HEALTH (SDOH): HOW HARD IS IT FOR YOU TO PAY FOR THE VERY BASICS LIKE FOOD, HOUSING, MEDICAL CARE, AND HEATING?: NOT HARD AT ALL

## 2021-06-09 NOTE — PROGRESS NOTES
CC hypertension follow up  Sinus headaches  Migraine headaches  HPI  65 y/o female known htn now with recurrent sinus congestion and headches. She has had runny nose, sneezing , frontal headaches  And otc meds not helping. She stopped flonase due to sl nose bleeding. No fever. No cough. No other aggravating , relieving , or modifying factors identified    She has hypertension  No chest pain, headache, sob, leg edema, stroke, kidney disease       She has migraines  Current headaches not typical for migraines  Not unilateral, not throbbing, no aura  No nausea, vomiting      Patient Active Problem List   Diagnosis    Low back pain    Hypertension    Menopausal symptoms    Major depressive disorder with single episode, in full remission ([facility] [address]      Body mass index is 30.95 kg/m².     Wt Readings from Last 3 Encounters:   06/09/21 186 lb (84.4 kg)   03/05/21 196 lb 6.4 oz (89.1 kg)   11/25/20 196 lb (88.9 kg)      BP Readings from Last 3 Encounters:   06/09/21 110/76   03/05/21 122/83   11/25/20 118/74      Current Outpatient Medications   Medication Sig Dispense Refill    topiramate (TOPAMAX) 100 MG tablet Take 1 tablet by mouth 2 times daily 180 tablet 1    fluticasone (FLONASE) 50 MCG/ACT nasal spray SHAKE LIQUID AND USE 2 SPRAYS IN EACH NOSTRIL DAILY 48 g 1    hydroCHLOROthiazide (MICROZIDE) 12.5 MG capsule Take 1 capsule by mouth every morning 90 capsule 1    ibuprofen (ADVIL;MOTRIN) 800 MG tablet Take 1 tablet by mouth every 8 hours as needed for Pain 270 tablet 1    loratadine (CLARITIN) 10 MG tablet Take 1 tablet by mouth daily 90 tablet 1    quinapril (ACCUPRIL) 10 MG tablet Take 1 tablet by mouth nightly 90 tablet 1    sertraline (ZOLOFT) 100 MG tablet Take 2 tablets by mouth daily 180 tablet 1    spironolactone (ALDACTONE) 50 MG tablet Take 1 tablet by mouth 2 times daily 180 tablet 1    traZODone (DESYREL) 50 MG tablet Take 1 tablet by mouth nightly 90 tablet 1    ziprasidone (GEODON) 60 MG capsule        No current facility-administered medications for this visit. Immunization History   Administered Date(s) Administered    COVID-19, Pfizer, PF, 30mcg/0.3mL 03/15/2021, 04/12/2021    Influenza, Intradermal, Preservative free 12/19/2012, 10/02/2013, 12/10/2014, 11/11/2015    Influenza, Quadv, IM, PF (6 mo and older Fluzone, Flulaval, Fluarix, and 3 yrs and older Afluria) 11/05/2019, 10/30/2020    Pneumococcal Polysaccharide ([id number]) 12/19/2012    Tdap (Boostrix, Adacel) 12/19/2012        Social History     Tobacco Use    Smoking status: Never Smoker    Smokeless tobacco: Never Used   Vaping Use    Vaping Use: Never used   Substance Use Topics    Alcohol use: Yes     Alcohol/week: 0.0 standard drinks     Comment: occasionally    Drug use: No       Physical Exam  Constitutional:       General: She is not in acute distress. Appearance: She is well-developed. She is not diaphoretic. HENT:      Head: Normocephalic and atraumatic. Right Ear: External ear normal.      Left Ear: External ear normal.      Nose: Congestion and rhinorrhea present. Mouth/Throat:      Pharynx: No oropharyngeal exudate. Eyes:      General: No scleral icterus. Left eye: No discharge. Conjunctiva/sclera: Conjunctivae normal.      Pupils: Pupils are equal, round, and reactive to light. Neck:      Thyroid: No thyromegaly. Vascular: No JVD. Trachea: No tracheal deviation. Cardiovascular:      Rate and Rhythm: Normal rate and regular rhythm. Heart sounds: Normal heart sounds. No murmur heard. No friction rub. No gallop. Pulmonary:      Effort: Pulmonary effort is normal. No respiratory distress. Breath sounds: Normal breath sounds. No stridor. No wheezing or rales. Chest:      Chest wall: No tenderness. Abdominal:      General: Bowel sounds are normal. There is no distension. Palpations: Abdomen is soft. There is no mass. Tenderness:  There is no abdominal tenderness. There is no guarding or rebound. Musculoskeletal:         General: No tenderness. Normal range of motion. Cervical back: Normal range of motion and neck supple. Lymphadenopathy:      Cervical: No cervical adenopathy. Skin:     General: Skin is warm and dry. Coloration: Skin is not pale. Findings: No erythema or rash. Neurological:      Mental Status: She is alert and oriented to person, place, and time. Cranial Nerves: No cranial nerve deficit. Coordination: Coordination normal.      Deep Tendon Reflexes: Reflexes are normal and symmetric. Reflexes normal.   Psychiatric:         Behavior: Behavior normal.         Thought Content: Thought content normal.         Judgment: Judgment normal.       Lab Results   Component Value Date    CREATININE 0.7 03/08/2021    BUN 18 03/08/2021     03/08/2021    K 4.4 03/08/2021     03/08/2021    CO2 27 03/08/2021           1. Acute non-recurrent frontal sinusitis  Add anti histamine and antibiotics  Tylenol as needed      - azithromycin (ZITHROMAX) 250 MG tablet; Take 2 tabs (500 mg) on Day 1, and take 1 tab (250 mg) on days 2 through 5. Dispense: 1 packet; Refill: 0  - loratadine (CLARITIN) 10 MG tablet; Take 1 tablet by mouth daily  Dispense: 30 tablet; Refill: 1    2. Essential hypertension  bp at goal  Low salt diet, meds refill, recent labs  - spironolactone (ALDACTONE) 50 MG tablet; Take 1 tablet by mouth 2 times daily  Dispense: 180 tablet; Refill: 1  - hydroCHLOROthiazide (MICROZIDE) 12.5 MG capsule; Take 1 capsule by mouth every morning  Dispense: 90 capsule; Refill: 1  - quinapril (ACCUPRIL) 10 MG tablet; Take 1 tablet by mouth nightly  Dispense: 90 tablet; Refill: 1    3. Migraine without aura and without status migrainosus, not intractable  Stable with topamax    4.  Migraine with aura and without status migrainosus, not intractable  Stable with current treatment  - topiramate (TOPAMAX) 100 MG tablet; Take 1 tablet by mouth 2 times daily  Dispense: 180 tablet;  Refill: 1

## 2021-06-23 DIAGNOSIS — G43.109 MIGRAINE WITH AURA AND WITHOUT STATUS MIGRAINOSUS, NOT INTRACTABLE: ICD-10-CM

## 2021-06-24 RX ORDER — TOPIRAMATE 100 MG/1
TABLET, FILM COATED ORAL
Qty: 180 TABLET | Refills: 1 | Status: SHIPPED | OUTPATIENT
Start: 2021-06-24 | End: 2021-12-13 | Stop reason: SDUPTHER

## 2021-06-24 NOTE — TELEPHONE ENCOUNTER
Medication:   Requested Prescriptions     Pending Prescriptions Disp Refills    topiramate (TOPAMAX) 100 MG tablet [Pharmacy Med Name: TOPIRAMATE 100MG TABLETS] 180 tablet 1     Sig: TAKE 1 TABLET BY MOUTH TWICE DAILY        Last Filled:      Patient Phone Number: [phone] (home)     Last appt: 6/9/2021   Next appt: 12/13/2021    Last OARRS: No flowsheet data found.

## 2021-09-19 DIAGNOSIS — F51.01 PRIMARY INSOMNIA: ICD-10-CM

## 2021-09-19 DIAGNOSIS — G89.29 CHRONIC BILATERAL LOW BACK PAIN, UNSPECIFIED WHETHER SCIATICA PRESENT: ICD-10-CM

## 2021-09-19 DIAGNOSIS — M54.50 CHRONIC BILATERAL LOW BACK PAIN, UNSPECIFIED WHETHER SCIATICA PRESENT: ICD-10-CM

## 2021-09-19 DIAGNOSIS — F32.5 MAJOR DEPRESSIVE DISORDER WITH SINGLE EPISODE, IN FULL REMISSION (HCC): ICD-10-CM

## 2021-09-20 RX ORDER — IBUPROFEN 800 MG/1
800 TABLET ORAL EVERY 8 HOURS PRN
Qty: 270 TABLET | Refills: 1 | Status: SHIPPED | OUTPATIENT
Start: 2021-09-20 | End: 2021-12-13 | Stop reason: SDUPTHER

## 2021-09-20 RX ORDER — TRAZODONE HYDROCHLORIDE 50 MG/1
TABLET ORAL
Qty: 90 TABLET | Refills: 1 | Status: SHIPPED | OUTPATIENT
Start: 2021-09-20 | End: 2021-12-13 | Stop reason: SDUPTHER

## 2021-09-20 RX ORDER — SERTRALINE HYDROCHLORIDE 100 MG/1
200 TABLET, FILM COATED ORAL DAILY
Qty: 180 TABLET | Refills: 1 | Status: SHIPPED | OUTPATIENT
Start: 2021-09-20 | End: 2021-12-13 | Stop reason: SDUPTHER

## 2021-10-05 DIAGNOSIS — J01.10 ACUTE NON-RECURRENT FRONTAL SINUSITIS: ICD-10-CM

## 2021-10-05 RX ORDER — LORATADINE 10 MG/1
10 TABLET ORAL DAILY
Qty: 30 TABLET | Refills: 1 | Status: SHIPPED | OUTPATIENT
Start: 2021-10-05 | End: 2021-12-13 | Stop reason: SDUPTHER

## 2021-11-03 DIAGNOSIS — I10 ESSENTIAL HYPERTENSION: ICD-10-CM

## 2021-11-03 RX ORDER — HYDROCHLOROTHIAZIDE 12.5 MG/1
12.5 CAPSULE, GELATIN COATED ORAL EVERY MORNING
Qty: 90 CAPSULE | Refills: 1 | Status: SHIPPED | OUTPATIENT
Start: 2021-11-03 | End: 2021-12-13 | Stop reason: SDUPTHER

## 2021-11-03 NOTE — TELEPHONE ENCOUNTER
Medication:   Requested Prescriptions     Pending Prescriptions Disp Refills    hydroCHLOROthiazide (MICROZIDE) 12.5 MG capsule [Pharmacy Med Name: HYDROCHLOROTHIAZIDE 12.5MG CAPSULES] 90 capsule 1     Sig: TAKE 1 CAPSULE BY MOUTH EVERY MORNING        Last Filled:      Patient Phone Number: [phone] (home)     Last appt: 6/9/2021   Next appt: 12/13/2021    Last OARRS: No flowsheet data found.

## 2021-12-02 DIAGNOSIS — I10 ESSENTIAL HYPERTENSION: ICD-10-CM

## 2021-12-02 NOTE — TELEPHONE ENCOUNTER
Medication:   Requested Prescriptions     Pending Prescriptions Disp Refills    spironolactone (ALDACTONE) 50 MG tablet [Pharmacy Med Name: SPIRONOLACTONE 50MG TABLETS] 180 tablet 1     Sig: TAKE 1 TABLET BY MOUTH TWICE DAILY        Last Filled:      Patient Phone Number: [phone] (home)     Last appt: 6/9/2021   Next appt: 12/13/2021    Last OARRS: No flowsheet data found.

## 2021-12-03 RX ORDER — SPIRONOLACTONE 50 MG/1
TABLET, FILM COATED ORAL
Qty: 180 TABLET | Refills: 1 | Status: SHIPPED | OUTPATIENT
Start: 2021-12-03 | End: 2021-12-13 | Stop reason: SDUPTHER

## 2021-12-13 ENCOUNTER — OFFICE VISIT (OUTPATIENT)
Dept: PRIMARY CARE CLINIC | Age: 61
End: 2021-12-13
Payer: MEDICARE

## 2021-12-13 VITALS
OXYGEN SATURATION: 97 % | TEMPERATURE: 97.3 F | DIASTOLIC BLOOD PRESSURE: 83 MMHG | HEART RATE: 64 BPM | HEIGHT: 65 IN | WEIGHT: 169 LBS | SYSTOLIC BLOOD PRESSURE: 127 MMHG | BODY MASS INDEX: 28.16 KG/M2

## 2021-12-13 DIAGNOSIS — I10 ESSENTIAL HYPERTENSION: ICD-10-CM

## 2021-12-13 DIAGNOSIS — J30.2 SEASONAL ALLERGIC RHINITIS, UNSPECIFIED TRIGGER: ICD-10-CM

## 2021-12-13 DIAGNOSIS — F51.01 PRIMARY INSOMNIA: ICD-10-CM

## 2021-12-13 DIAGNOSIS — G89.29 CHRONIC BILATERAL LOW BACK PAIN, UNSPECIFIED WHETHER SCIATICA PRESENT: Primary | ICD-10-CM

## 2021-12-13 DIAGNOSIS — G43.109 MIGRAINE WITH AURA AND WITHOUT STATUS MIGRAINOSUS, NOT INTRACTABLE: ICD-10-CM

## 2021-12-13 DIAGNOSIS — F32.5 MAJOR DEPRESSIVE DISORDER WITH SINGLE EPISODE, IN FULL REMISSION (HCC): ICD-10-CM

## 2021-12-13 DIAGNOSIS — M54.50 CHRONIC BILATERAL LOW BACK PAIN, UNSPECIFIED WHETHER SCIATICA PRESENT: Primary | ICD-10-CM

## 2021-12-13 DIAGNOSIS — Z23 NEED FOR SHINGLES VACCINE: ICD-10-CM

## 2021-12-13 PROCEDURE — 99214 OFFICE O/P EST MOD 30 MIN: CPT | Performed by: FAMILY MEDICINE

## 2021-12-13 RX ORDER — ZOSTER VACCINE RECOMBINANT, ADJUVANTED 50 MCG/0.5
0.5 KIT INTRAMUSCULAR ONCE
Qty: 0.5 ML | Refills: 0 | Status: SHIPPED | OUTPATIENT
Start: 2021-12-13 | End: 2021-12-13

## 2021-12-13 RX ORDER — QUINAPRIL 10 MG/1
10 TABLET ORAL NIGHTLY
Qty: 90 TABLET | Refills: 1 | Status: SHIPPED | OUTPATIENT
Start: 2021-12-13 | End: 2022-03-14 | Stop reason: SDUPTHER

## 2021-12-13 RX ORDER — LORATADINE 10 MG/1
10 TABLET ORAL DAILY
Qty: 30 TABLET | Refills: 1 | Status: SHIPPED | OUTPATIENT
Start: 2021-12-13 | End: 2022-03-14 | Stop reason: SDUPTHER

## 2021-12-13 RX ORDER — TOPIRAMATE 100 MG/1
TABLET, FILM COATED ORAL
Qty: 180 TABLET | Refills: 1 | Status: SHIPPED | OUTPATIENT
Start: 2021-12-13 | End: 2022-03-14 | Stop reason: SDUPTHER

## 2021-12-13 RX ORDER — SERTRALINE HYDROCHLORIDE 100 MG/1
200 TABLET, FILM COATED ORAL DAILY
Qty: 180 TABLET | Refills: 1 | Status: SHIPPED | OUTPATIENT
Start: 2021-12-13 | End: 2022-03-14 | Stop reason: SDUPTHER

## 2021-12-13 RX ORDER — IBUPROFEN 800 MG/1
800 TABLET ORAL EVERY 8 HOURS PRN
Qty: 270 TABLET | Refills: 1 | Status: SHIPPED | OUTPATIENT
Start: 2021-12-13 | End: 2022-03-14 | Stop reason: SDUPTHER

## 2021-12-13 RX ORDER — SPIRONOLACTONE 50 MG/1
TABLET, FILM COATED ORAL
Qty: 180 TABLET | Refills: 1 | Status: SHIPPED | OUTPATIENT
Start: 2021-12-13 | End: 2022-03-14 | Stop reason: SDUPTHER

## 2021-12-13 RX ORDER — AZITHROMYCIN 250 MG/1
TABLET, FILM COATED ORAL
Qty: 1 PACKET | Refills: 0 | Status: SHIPPED | OUTPATIENT
Start: 2021-12-13 | End: 2021-12-13

## 2021-12-13 RX ORDER — HYDROCHLOROTHIAZIDE 12.5 MG/1
12.5 CAPSULE, GELATIN COATED ORAL EVERY MORNING
Qty: 90 CAPSULE | Refills: 1 | Status: SHIPPED | OUTPATIENT
Start: 2021-12-13 | End: 2022-03-14 | Stop reason: SDUPTHER

## 2021-12-13 RX ORDER — FLUTICASONE PROPIONATE 50 MCG
SPRAY, SUSPENSION (ML) NASAL
Qty: 48 G | Refills: 1 | Status: SHIPPED | OUTPATIENT
Start: 2021-12-13 | End: 2022-03-14 | Stop reason: SDUPTHER

## 2021-12-13 RX ORDER — ZIPRASIDONE HYDROCHLORIDE 60 MG/1
60 CAPSULE ORAL 2 TIMES DAILY WITH MEALS
Qty: 60 CAPSULE | Refills: 1 | Status: SHIPPED | OUTPATIENT
Start: 2021-12-13 | End: 2022-02-26

## 2021-12-13 RX ORDER — TRAZODONE HYDROCHLORIDE 50 MG/1
TABLET ORAL
Qty: 90 TABLET | Refills: 1 | Status: SHIPPED | OUTPATIENT
Start: 2021-12-13 | End: 2022-03-14 | Stop reason: SDUPTHER

## 2021-12-13 NOTE — PROGRESS NOTES
3 month follow up visit    Low back pian  Anxiety depression      HPI  64 y/o  Female fu htn  Insomnia, depression,  Chronic low back pain, allergies    No new issue today low back after  Over activity now improving    Needs meds refill    Hypertension     Anxiety depression  Changing psych  Needs meds refill    Insomnia  Stable    Allergies  Want meds refill          Lab Results   Component Value Date    CREATININE 0.7 03/08/2021    BUN 18 03/08/2021     03/08/2021    K 4.4 03/08/2021     03/08/2021    CO2 27 03/08/2021       1. Chronic bilateral low back pain, unspecified whether sciatica present  Recent overactivity , pain increased,  Cut back on activity, now improved  - ibuprofen (ADVIL;MOTRIN) 800 MG tablet; Take 1 tablet by mouth every 8 hours as needed for Pain  Dispense: 270 tablet; Refill: 1    2. Essential hypertension  bp at goal  Recent renal panel  - spironolactone (ALDACTONE) 50 MG tablet; TAKE 1 TABLET BY MOUTH TWICE DAILY  Dispense: 180 tablet; Refill: 1  - quinapril (ACCUPRIL) 10 MG tablet; Take 1 tablet by mouth nightly  Dispense: 90 tablet; Refill: 1  - hydroCHLOROthiazide (MICROZIDE) 12.5 MG capsule; Take 1 capsule by mouth every morning  Dispense: 90 capsule; Refill: 1    3. Seasonal allergic rhinitis, unspecified trigger  No new allergy sxs  sxs controlled  - loratadine (CLARITIN) 10 MG tablet; Take 1 tablet by mouth daily  Dispense: 30 tablet; Refill: 1  - fluticasone (FLONASE) 50 MCG/ACT nasal spray; SHAKE LIQUID AND USE 2 SPRAYS IN EACH NOSTRIL DAILY  Dispense: 48 g; Refill: 1    4. Primary insomnia  Her sxs controlled  At this time  Discussed good sleep hygiene     - traZODone (DESYREL) 50 MG tablet; TAKE 1 TABLET BY MOUTH EVERY NIGHT  Dispense: 90 tablet; Refill: 1    5. Migraine with aura and without status migrainosus, not intractable  Her sxs controlled  Will refill  - topiramate (TOPAMAX) 100 MG tablet; TAKE 1 TABLET BY MOUTH TWICE DAILY  Dispense: 180 tablet;  Refill: 1    6. Major depressive disorder with single episode, in full remission ([facility] [address]  No suicidal ideation  In process of getting new psychiatrist   - ziprasidone (GEODON) 60 MG capsule; Take 1 capsule by mouth 2 times daily (with meals)  Dispense: 60 capsule; Refill: 1  - sertraline (ZOLOFT) 100 MG tablet; Take 2 tablets by mouth daily  Dispense: 180 tablet;  Refill: 1

## 2022-02-24 DIAGNOSIS — F32.5 MAJOR DEPRESSIVE DISORDER WITH SINGLE EPISODE, IN FULL REMISSION (HCC): ICD-10-CM

## 2022-02-26 RX ORDER — ZIPRASIDONE HYDROCHLORIDE 60 MG/1
CAPSULE ORAL
Qty: 60 CAPSULE | Refills: 6 | Status: SHIPPED | OUTPATIENT
Start: 2022-02-26 | End: 2022-03-14 | Stop reason: SDUPTHER

## 2022-03-14 ENCOUNTER — OFFICE VISIT (OUTPATIENT)
Dept: PRIMARY CARE CLINIC | Age: 62
End: 2022-03-14
Payer: MEDICARE

## 2022-03-14 VITALS
SYSTOLIC BLOOD PRESSURE: 108 MMHG | DIASTOLIC BLOOD PRESSURE: 73 MMHG | OXYGEN SATURATION: 97 % | TEMPERATURE: 97.2 F | HEART RATE: 63 BPM | WEIGHT: 168 LBS | HEIGHT: 65 IN | BODY MASS INDEX: 27.99 KG/M2

## 2022-03-14 DIAGNOSIS — G89.29 CHRONIC BILATERAL LOW BACK PAIN, UNSPECIFIED WHETHER SCIATICA PRESENT: ICD-10-CM

## 2022-03-14 DIAGNOSIS — M54.50 CHRONIC BILATERAL LOW BACK PAIN, UNSPECIFIED WHETHER SCIATICA PRESENT: ICD-10-CM

## 2022-03-14 DIAGNOSIS — F32.5 MAJOR DEPRESSIVE DISORDER WITH SINGLE EPISODE, IN FULL REMISSION (HCC): ICD-10-CM

## 2022-03-14 DIAGNOSIS — J30.2 SEASONAL ALLERGIC RHINITIS, UNSPECIFIED TRIGGER: ICD-10-CM

## 2022-03-14 DIAGNOSIS — G43.109 MIGRAINE WITH AURA AND WITHOUT STATUS MIGRAINOSUS, NOT INTRACTABLE: ICD-10-CM

## 2022-03-14 DIAGNOSIS — F51.01 PRIMARY INSOMNIA: ICD-10-CM

## 2022-03-14 DIAGNOSIS — I10 ESSENTIAL HYPERTENSION: Primary | ICD-10-CM

## 2022-03-14 PROCEDURE — 99214 OFFICE O/P EST MOD 30 MIN: CPT | Performed by: FAMILY MEDICINE

## 2022-03-14 RX ORDER — SPIRONOLACTONE 50 MG/1
TABLET, FILM COATED ORAL
Qty: 180 TABLET | Refills: 1 | Status: SHIPPED | OUTPATIENT
Start: 2022-03-14 | End: 2022-06-15 | Stop reason: SDUPTHER

## 2022-03-14 RX ORDER — LORATADINE 10 MG/1
10 TABLET ORAL DAILY
Qty: 30 TABLET | Refills: 1 | Status: SHIPPED | OUTPATIENT
Start: 2022-03-14 | End: 2022-03-14

## 2022-03-14 RX ORDER — TOPIRAMATE 100 MG/1
TABLET, FILM COATED ORAL
Qty: 180 TABLET | Refills: 1 | Status: SHIPPED | OUTPATIENT
Start: 2022-03-14 | End: 2022-06-15 | Stop reason: SDUPTHER

## 2022-03-14 RX ORDER — ZIPRASIDONE HYDROCHLORIDE 60 MG/1
CAPSULE ORAL
Qty: 60 CAPSULE | Refills: 6 | Status: SHIPPED | OUTPATIENT
Start: 2022-03-14 | End: 2022-06-15 | Stop reason: SDUPTHER

## 2022-03-14 RX ORDER — IBUPROFEN 800 MG/1
800 TABLET ORAL EVERY 8 HOURS PRN
Qty: 270 TABLET | Refills: 1 | Status: SHIPPED | OUTPATIENT
Start: 2022-03-14 | End: 2022-06-08

## 2022-03-14 RX ORDER — QUINAPRIL 10 MG/1
10 TABLET ORAL NIGHTLY
Qty: 90 TABLET | Refills: 1 | Status: SHIPPED | OUTPATIENT
Start: 2022-03-14 | End: 2022-06-15 | Stop reason: SDUPTHER

## 2022-03-14 RX ORDER — LEVOCETIRIZINE DIHYDROCHLORIDE 5 MG/1
5 TABLET, FILM COATED ORAL NIGHTLY
Qty: 30 TABLET | Refills: 3 | Status: SHIPPED | OUTPATIENT
Start: 2022-03-14 | End: 2022-03-15

## 2022-03-14 RX ORDER — HYDROCHLOROTHIAZIDE 12.5 MG/1
12.5 CAPSULE, GELATIN COATED ORAL EVERY MORNING
Qty: 90 CAPSULE | Refills: 1 | Status: SHIPPED | OUTPATIENT
Start: 2022-03-14 | End: 2022-06-08

## 2022-03-14 RX ORDER — TRAZODONE HYDROCHLORIDE 50 MG/1
TABLET ORAL
Qty: 90 TABLET | Refills: 1 | Status: SHIPPED | OUTPATIENT
Start: 2022-03-14 | End: 2022-06-15 | Stop reason: SDUPTHER

## 2022-03-14 RX ORDER — FLUTICASONE PROPIONATE 50 MCG
SPRAY, SUSPENSION (ML) NASAL
Qty: 48 G | Refills: 1 | Status: SHIPPED | OUTPATIENT
Start: 2022-03-14 | End: 2022-06-15 | Stop reason: SDUPTHER

## 2022-03-14 RX ORDER — SERTRALINE HYDROCHLORIDE 100 MG/1
200 TABLET, FILM COATED ORAL DAILY
Qty: 180 TABLET | Refills: 1 | Status: SHIPPED | OUTPATIENT
Start: 2022-03-14 | End: 2022-06-15 | Stop reason: SDUPTHER

## 2022-03-14 ASSESSMENT — PATIENT HEALTH QUESTIONNAIRE - PHQ9
SUM OF ALL RESPONSES TO PHQ QUESTIONS 1-9: 7
5. POOR APPETITE OR OVEREATING: 1
SUM OF ALL RESPONSES TO PHQ9 QUESTIONS 1 & 2: 2
4. FEELING TIRED OR HAVING LITTLE ENERGY: 1
7. TROUBLE CONCENTRATING ON THINGS, SUCH AS READING THE NEWSPAPER OR WATCHING TELEVISION: 1
SUM OF ALL RESPONSES TO PHQ QUESTIONS 1-9: 7
1. LITTLE INTEREST OR PLEASURE IN DOING THINGS: 1
9. THOUGHTS THAT YOU WOULD BE BETTER OFF DEAD, OR OF HURTING YOURSELF: 0
SUM OF ALL RESPONSES TO PHQ QUESTIONS 1-9: 7
8. MOVING OR SPEAKING SO SLOWLY THAT OTHER PEOPLE COULD HAVE NOTICED. OR THE OPPOSITE, BEING SO FIGETY OR RESTLESS THAT YOU HAVE BEEN MOVING AROUND A LOT MORE THAN USUAL: 1
2. FEELING DOWN, DEPRESSED OR HOPELESS: 1
10. IF YOU CHECKED OFF ANY PROBLEMS, HOW DIFFICULT HAVE THESE PROBLEMS MADE IT FOR YOU TO DO YOUR WORK, TAKE CARE OF THINGS AT HOME, OR GET ALONG WITH OTHER PEOPLE: 1
SUM OF ALL RESPONSES TO PHQ QUESTIONS 1-9: 7
6. FEELING BAD ABOUT YOURSELF - OR THAT YOU ARE A FAILURE OR HAVE LET YOURSELF OR YOUR FAMILY DOWN: 1
3. TROUBLE FALLING OR STAYING ASLEEP: 0

## 2022-03-14 NOTE — PROGRESS NOTES
CC 3 months follow up visit    HPI 62 y/o female phd htn depression insomnia migraines allergic rhinitis      Hypertension  No chest pain, headache, sob, leg edema, stroke, kidney disease     Primary insomnia  Sleeping better now  About 7 - 8 hrs  Good sleep hygiene     Migraines  Less headaches  Less than one a month    Allergic Rhinitis: Bruce Fenton is here for evaluation of possible allergic rhinitis and conjunctivitis. Patient's symptoms include clear rhinorrhea. These symptoms are perennial with seasonal exacerbation. Current triggers include exposure to no known precipitant. The patient has been suffering from these symptoms for approximately 3 months. The patient has tried prescription nasal sprays with inadequate relief of symptoms. Immunotherapy has never been tried. The patient has never had nasal polyps. The patient has no history of asthma. The patient does not suffer from frequent sinopulmonary infections. The patient has not had sinus surgery in the past. The patient has no history of eczema. Physical Exam  Constitutional:       General: She is not in acute distress. Appearance: She is well-developed. She is not diaphoretic. HENT:      Head: Normocephalic and atraumatic. Right Ear: External ear normal.      Left Ear: External ear normal.      Nose: Nose normal.      Mouth/Throat:      Pharynx: No oropharyngeal exudate. Eyes:      General: No scleral icterus. Left eye: No discharge. Conjunctiva/sclera: Conjunctivae normal.      Pupils: Pupils are equal, round, and reactive to light. Neck:      Thyroid: No thyromegaly. Vascular: No JVD. Trachea: No tracheal deviation. Cardiovascular:      Rate and Rhythm: Normal rate and regular rhythm. Heart sounds: Normal heart sounds. No murmur heard. No friction rub. No gallop. Pulmonary:      Effort: Pulmonary effort is normal. No respiratory distress. Breath sounds: Normal breath sounds. No stridor.  No 60 MG capsule; TAKE 1 CAPSULE BY MOUTH TWICE DAILY WITH MEALS  Dispense: 60 capsule; Refill: 6  - sertraline (ZOLOFT) 100 MG tablet; Take 2 tablets by mouth daily  Dispense: 180 tablet; Refill: 1    4. Primary insomnia  Has good hygiene   No side effects  cont  - traZODone (DESYREL) 50 MG tablet; TAKE 1 TABLET BY MOUTH EVERY NIGHT  Dispense: 90 tablet; Refill: 1    5. Chronic bilateral low back pain, unspecified whether sciatica present  Less back pain  No bladder or bowel issues  - ibuprofen (ADVIL;MOTRIN) 800 MG tablet; Take 1 tablet by mouth every 8 hours as needed for Pain  Dispense: 270 tablet; Refill: 1    6. Migraine with aura and without status migrainosus, not intractable  Stable with the following, less than one migraine a month  - topiramate (TOPAMAX) 100 MG tablet; TAKE 1 TABLET BY MOUTH TWICE DAILY  Dispense: 180 tablet;  Refill: 1

## 2022-03-15 RX ORDER — LEVOCETIRIZINE DIHYDROCHLORIDE 5 MG/1
TABLET, FILM COATED ORAL
Qty: 90 TABLET | Refills: 2 | Status: SHIPPED | OUTPATIENT
Start: 2022-03-15 | End: 2022-09-21

## 2022-06-08 DIAGNOSIS — I10 ESSENTIAL HYPERTENSION: ICD-10-CM

## 2022-06-08 DIAGNOSIS — J30.2 SEASONAL ALLERGIC RHINITIS, UNSPECIFIED TRIGGER: ICD-10-CM

## 2022-06-08 DIAGNOSIS — M54.50 CHRONIC BILATERAL LOW BACK PAIN, UNSPECIFIED WHETHER SCIATICA PRESENT: ICD-10-CM

## 2022-06-08 DIAGNOSIS — G89.29 CHRONIC BILATERAL LOW BACK PAIN, UNSPECIFIED WHETHER SCIATICA PRESENT: ICD-10-CM

## 2022-06-08 RX ORDER — HYDROCHLOROTHIAZIDE 12.5 MG/1
12.5 CAPSULE, GELATIN COATED ORAL EVERY MORNING
Qty: 90 CAPSULE | Refills: 1 | Status: SHIPPED | OUTPATIENT
Start: 2022-06-08 | End: 2022-06-15 | Stop reason: SDUPTHER

## 2022-06-08 RX ORDER — LORATADINE 10 MG/1
10 TABLET ORAL DAILY
Qty: 30 TABLET | Refills: 1 | Status: SHIPPED | OUTPATIENT
Start: 2022-06-08 | End: 2022-06-15 | Stop reason: SDUPTHER

## 2022-06-08 RX ORDER — IBUPROFEN 800 MG/1
800 TABLET ORAL EVERY 8 HOURS PRN
Qty: 270 TABLET | Refills: 1 | Status: SHIPPED | OUTPATIENT
Start: 2022-06-08 | End: 2022-06-15 | Stop reason: SDUPTHER

## 2022-06-15 ENCOUNTER — OFFICE VISIT (OUTPATIENT)
Dept: PRIMARY CARE CLINIC | Age: 62
End: 2022-06-15
Payer: MEDICARE

## 2022-06-15 VITALS
TEMPERATURE: 98.1 F | WEIGHT: 166 LBS | SYSTOLIC BLOOD PRESSURE: 133 MMHG | HEIGHT: 65 IN | BODY MASS INDEX: 27.66 KG/M2 | DIASTOLIC BLOOD PRESSURE: 83 MMHG | HEART RATE: 59 BPM | OXYGEN SATURATION: 98 %

## 2022-06-15 DIAGNOSIS — J30.2 SEASONAL ALLERGIC RHINITIS, UNSPECIFIED TRIGGER: ICD-10-CM

## 2022-06-15 DIAGNOSIS — I10 ESSENTIAL HYPERTENSION: ICD-10-CM

## 2022-06-15 DIAGNOSIS — G89.29 CHRONIC BILATERAL LOW BACK PAIN, UNSPECIFIED WHETHER SCIATICA PRESENT: ICD-10-CM

## 2022-06-15 DIAGNOSIS — F51.01 PRIMARY INSOMNIA: ICD-10-CM

## 2022-06-15 DIAGNOSIS — F32.5 MAJOR DEPRESSIVE DISORDER WITH SINGLE EPISODE, IN FULL REMISSION (HCC): ICD-10-CM

## 2022-06-15 DIAGNOSIS — G43.109 MIGRAINE WITH AURA AND WITHOUT STATUS MIGRAINOSUS, NOT INTRACTABLE: ICD-10-CM

## 2022-06-15 DIAGNOSIS — L30.9 DERMATITIS: ICD-10-CM

## 2022-06-15 DIAGNOSIS — M54.50 CHRONIC BILATERAL LOW BACK PAIN, UNSPECIFIED WHETHER SCIATICA PRESENT: ICD-10-CM

## 2022-06-15 DIAGNOSIS — L30.9 DERMATITIS: Primary | ICD-10-CM

## 2022-06-15 LAB
ANION GAP SERPL CALCULATED.3IONS-SCNC: 16 MMOL/L (ref 3–16)
AST SERPL-CCNC: 17 U/L (ref 15–37)
BUN BLDV-MCNC: 18 MG/DL (ref 7–20)
CALCIUM SERPL-MCNC: 10 MG/DL (ref 8.3–10.6)
CHLORIDE BLD-SCNC: 105 MMOL/L (ref 99–110)
CHOLESTEROL, FASTING: 179 MG/DL (ref 0–199)
CO2: 20 MMOL/L (ref 21–32)
CREAT SERPL-MCNC: 0.9 MG/DL (ref 0.6–1.1)
GFR AFRICAN AMERICAN: >60
GFR NON-AFRICAN AMERICAN: >60
GLUCOSE BLD-MCNC: 81 MG/DL (ref 70–99)
HCT VFR BLD CALC: 38 % (ref 36–48)
HDLC SERPL-MCNC: 74 MG/DL (ref 40–60)
HEMOGLOBIN: 11.9 G/DL (ref 12–16)
LDL CHOLESTEROL CALCULATED: 89 MG/DL
MCH RBC QN AUTO: 22.9 PG (ref 26–34)
MCHC RBC AUTO-ENTMCNC: 31.4 G/DL (ref 31–36)
MCV RBC AUTO: 73 FL (ref 80–100)
PDW BLD-RTO: 16.4 % (ref 12.4–15.4)
PLATELET # BLD: 260 K/UL (ref 135–450)
PMV BLD AUTO: 10.1 FL (ref 5–10.5)
POTASSIUM SERPL-SCNC: 3.9 MMOL/L (ref 3.5–5.1)
RBC # BLD: 5.21 M/UL (ref 4–5.2)
SODIUM BLD-SCNC: 141 MMOL/L (ref 136–145)
TRIGLYCERIDE, FASTING: 80 MG/DL (ref 0–150)
VLDLC SERPL CALC-MCNC: 16 MG/DL
WBC # BLD: 9.8 K/UL (ref 4–11)

## 2022-06-15 PROCEDURE — 99214 OFFICE O/P EST MOD 30 MIN: CPT | Performed by: FAMILY MEDICINE

## 2022-06-15 RX ORDER — TRAZODONE HYDROCHLORIDE 50 MG/1
TABLET ORAL
Qty: 90 TABLET | Refills: 1 | Status: SHIPPED | OUTPATIENT
Start: 2022-06-15

## 2022-06-15 RX ORDER — TRIAMCINOLONE ACETONIDE 1 MG/G
CREAM TOPICAL
Qty: 28.8 G | Refills: 1 | Status: SHIPPED | OUTPATIENT
Start: 2022-06-15 | End: 2022-09-21

## 2022-06-15 RX ORDER — QUINAPRIL 10 MG/1
10 TABLET ORAL NIGHTLY
Qty: 90 TABLET | Refills: 1 | Status: SHIPPED | OUTPATIENT
Start: 2022-06-15 | End: 2022-09-08

## 2022-06-15 RX ORDER — IBUPROFEN 800 MG/1
800 TABLET ORAL EVERY 8 HOURS PRN
Qty: 270 TABLET | Refills: 1 | Status: SHIPPED | OUTPATIENT
Start: 2022-06-15 | End: 2022-09-21

## 2022-06-15 RX ORDER — LORATADINE 10 MG/1
10 TABLET ORAL DAILY
Qty: 30 TABLET | Refills: 1 | Status: SHIPPED | OUTPATIENT
Start: 2022-06-15

## 2022-06-15 RX ORDER — SPIRONOLACTONE 50 MG/1
TABLET, FILM COATED ORAL
Qty: 180 TABLET | Refills: 1 | Status: SHIPPED | OUTPATIENT
Start: 2022-06-15

## 2022-06-15 RX ORDER — FLUTICASONE PROPIONATE 50 MCG
SPRAY, SUSPENSION (ML) NASAL
Qty: 48 G | Refills: 1 | Status: SHIPPED | OUTPATIENT
Start: 2022-06-15

## 2022-06-15 RX ORDER — HYDROCHLOROTHIAZIDE 12.5 MG/1
12.5 CAPSULE, GELATIN COATED ORAL EVERY MORNING
Qty: 90 CAPSULE | Refills: 1 | Status: SHIPPED | OUTPATIENT
Start: 2022-06-15

## 2022-06-15 RX ORDER — ZIPRASIDONE HYDROCHLORIDE 60 MG/1
CAPSULE ORAL
Qty: 60 CAPSULE | Refills: 6 | Status: SHIPPED | OUTPATIENT
Start: 2022-06-15 | End: 2022-10-17

## 2022-06-15 RX ORDER — TOPIRAMATE 100 MG/1
TABLET, FILM COATED ORAL
Qty: 180 TABLET | Refills: 1 | Status: SHIPPED | OUTPATIENT
Start: 2022-06-15

## 2022-06-15 RX ORDER — SERTRALINE HYDROCHLORIDE 100 MG/1
200 TABLET, FILM COATED ORAL DAILY
Qty: 180 TABLET | Refills: 1 | Status: SHIPPED | OUTPATIENT
Start: 2022-06-15

## 2022-06-15 SDOH — ECONOMIC STABILITY: FOOD INSECURITY: WITHIN THE PAST 12 MONTHS, YOU WORRIED THAT YOUR FOOD WOULD RUN OUT BEFORE YOU GOT MONEY TO BUY MORE.: NEVER TRUE

## 2022-06-15 SDOH — ECONOMIC STABILITY: FOOD INSECURITY: WITHIN THE PAST 12 MONTHS, THE FOOD YOU BOUGHT JUST DIDN'T LAST AND YOU DIDN'T HAVE MONEY TO GET MORE.: NEVER TRUE

## 2022-06-15 ASSESSMENT — PATIENT HEALTH QUESTIONNAIRE - PHQ9
SUM OF ALL RESPONSES TO PHQ QUESTIONS 1-9: 2
SUM OF ALL RESPONSES TO PHQ QUESTIONS 1-9: 2
SUM OF ALL RESPONSES TO PHQ9 QUESTIONS 1 & 2: 2
6. FEELING BAD ABOUT YOURSELF - OR THAT YOU ARE A FAILURE OR HAVE LET YOURSELF OR YOUR FAMILY DOWN: 1
SUM OF ALL RESPONSES TO PHQ QUESTIONS 1-9: 6
1. LITTLE INTEREST OR PLEASURE IN DOING THINGS: 0
9. THOUGHTS THAT YOU WOULD BE BETTER OFF DEAD, OR OF HURTING YOURSELF: 0
8. MOVING OR SPEAKING SO SLOWLY THAT OTHER PEOPLE COULD HAVE NOTICED. OR THE OPPOSITE, BEING SO FIGETY OR RESTLESS THAT YOU HAVE BEEN MOVING AROUND A LOT MORE THAN USUAL: 0
1. LITTLE INTEREST OR PLEASURE IN DOING THINGS: 0
SUM OF ALL RESPONSES TO PHQ QUESTIONS 1-9: 6
2. FEELING DOWN, DEPRESSED OR HOPELESS: 2
7. TROUBLE CONCENTRATING ON THINGS, SUCH AS READING THE NEWSPAPER OR WATCHING TELEVISION: 3
4. FEELING TIRED OR HAVING LITTLE ENERGY: 0
5. POOR APPETITE OR OVEREATING: 0
2. FEELING DOWN, DEPRESSED OR HOPELESS: 2
SUM OF ALL RESPONSES TO PHQ9 QUESTIONS 1 & 2: 2
SUM OF ALL RESPONSES TO PHQ QUESTIONS 1-9: 2
3. TROUBLE FALLING OR STAYING ASLEEP: 0
SUM OF ALL RESPONSES TO PHQ QUESTIONS 1-9: 2
SUM OF ALL RESPONSES TO PHQ QUESTIONS 1-9: 6
10. IF YOU CHECKED OFF ANY PROBLEMS, HOW DIFFICULT HAVE THESE PROBLEMS MADE IT FOR YOU TO DO YOUR WORK, TAKE CARE OF THINGS AT HOME, OR GET ALONG WITH OTHER PEOPLE: 0
SUM OF ALL RESPONSES TO PHQ QUESTIONS 1-9: 6

## 2022-06-15 ASSESSMENT — SOCIAL DETERMINANTS OF HEALTH (SDOH)
HOW HARD IS IT FOR YOU TO PAY FOR THE VERY BASICS LIKE FOOD, HOUSING, MEDICAL CARE, AND HEATING?: NOT HARD AT ALL
HOW HARD IS IT FOR YOU TO PAY FOR THE VERY BASICS LIKE FOOD, HOUSING, MEDICAL CARE, AND HEATING?: NOT VERY HARD

## 2022-06-15 NOTE — PROGRESS NOTES
Lymphadenopathy:      Cervical: No cervical adenopathy. Skin:     General: Skin is warm and dry. Coloration: Skin is not pale. Findings: No erythema or rash. Comments: Rash of arms and legs appreciated. Small almost petechiae like   Neurological:      Mental Status: She is alert and oriented to person, place, and time. Cranial Nerves: No cranial nerve deficit. Coordination: Coordination normal.      Deep Tendon Reflexes: Reflexes are normal and symmetric. Reflexes normal.   Psychiatric:         Behavior: Behavior normal.         Thought Content: Thought content normal.         Judgment: Judgment normal.         Torie Johnson was seen today for 3 month follow-up. Diagnoses and all orders for this visit:    Dermatitis  Pt thinks some improvement    She thinks allergy is to a new  Detergent which she has stopped  Almost looks petechial  Ck CBC  Will add topical steroid  Cont claritin  Fu in 2 weeks  -     triamcinolone (KENALOG) 0.1 % cream; Apply topically 2 times daily.  -     CBC; Future    Seasonal allergic rhinitis, unspecified trigger  Now improved with nasal steroid spray  -     fluticasone (FLONASE) 50 MCG/ACT nasal spray; SHAKE LIQUID AND USE 2 SPRAYS IN EACH NOSTRIL DAILY  -     loratadine (CLARITIN) 10 MG tablet; Take 1 tablet by mouth daily  -     CBC; Future    Essential hypertension  bp is at goal  Cont bp meds  -     hydroCHLOROthiazide (MICROZIDE) 12.5 MG capsule; Take 1 capsule by mouth every morning  -     quinapril (ACCUPRIL) 10 MG tablet; Take 1 tablet by mouth nightly  -     spironolactone (ALDACTONE) 50 MG tablet; TAKE 1 TABLET BY MOUTH TWICE DAILY  -     CBC; Future    Chronic bilateral low back pain, unspecified whether sciatica present  No bladder or bowel issues  Cont with following treatment  -     ibuprofen (ADVIL;MOTRIN) 800 MG tablet;  Take 1 tablet by mouth every 8 hours as needed for Pain    Major depressive disorder with single episode, in full remission ([address]  Now sleeping ok  No SI  Refill ssri  -     sertraline (ZOLOFT) 100 MG tablet;  Take 2 tablets by mouth daily  -     ziprasidone (GEODON) 60 MG capsule; TAKE 1 CAPSULE BY MOUTH TWICE DAILY WITH MEALS    Migraine with aura and without status migrainosus, not intractable  Less than one a week, improved  with  -     topiramate (TOPAMAX) 100 MG tablet; TAKE 1 TABLET BY MOUTH TWICE DAILY    Primary insomnia  Discussed sleep hygiene   Improved with  -     traZODone (DESYREL) 50 MG tablet; TAKE 1 TABLET BY MOUTH EVERY NIGHT

## 2022-09-08 DIAGNOSIS — I10 ESSENTIAL HYPERTENSION: ICD-10-CM

## 2022-09-08 RX ORDER — QUINAPRIL 10 MG/1
TABLET ORAL
Qty: 90 TABLET | Refills: 1 | Status: SHIPPED | OUTPATIENT
Start: 2022-09-08

## 2022-09-21 ENCOUNTER — OFFICE VISIT (OUTPATIENT)
Dept: PRIMARY CARE CLINIC | Age: 62
End: 2022-09-21
Payer: MEDICARE

## 2022-09-21 VITALS
OXYGEN SATURATION: 97 % | DIASTOLIC BLOOD PRESSURE: 74 MMHG | SYSTOLIC BLOOD PRESSURE: 117 MMHG | TEMPERATURE: 97.7 F | HEIGHT: 65 IN | WEIGHT: 169 LBS | HEART RATE: 53 BPM | BODY MASS INDEX: 28.16 KG/M2

## 2022-09-21 DIAGNOSIS — Z12.11 SCREEN FOR COLON CANCER: ICD-10-CM

## 2022-09-21 DIAGNOSIS — M51.16 LUMBAR DISC DISEASE WITH RADICULOPATHY: Primary | ICD-10-CM

## 2022-09-21 PROCEDURE — 99214 OFFICE O/P EST MOD 30 MIN: CPT | Performed by: FAMILY MEDICINE

## 2022-09-21 RX ORDER — LIDOCAINE 50 MG/G
1 PATCH TOPICAL DAILY
Qty: 10 PATCH | Refills: 0 | Status: SHIPPED | OUTPATIENT
Start: 2022-09-21 | End: 2022-10-01

## 2022-09-21 RX ORDER — IBUPROFEN 800 MG/1
TABLET ORAL
Qty: 120 TABLET | Refills: 0 | Status: SHIPPED | OUTPATIENT
Start: 2022-09-21 | End: 2022-11-04 | Stop reason: SDUPTHER

## 2022-09-21 RX ORDER — TIZANIDINE 4 MG/1
4 TABLET ORAL NIGHTLY PRN
Qty: 30 TABLET | Refills: 0 | Status: SHIPPED | OUTPATIENT
Start: 2022-09-21 | End: 2022-11-04 | Stop reason: SDUPTHER

## 2022-09-21 ASSESSMENT — PATIENT HEALTH QUESTIONNAIRE - PHQ9
7. TROUBLE CONCENTRATING ON THINGS, SUCH AS READING THE NEWSPAPER OR WATCHING TELEVISION: 3
SUM OF ALL RESPONSES TO PHQ9 QUESTIONS 1 & 2: 1
1. LITTLE INTEREST OR PLEASURE IN DOING THINGS: 0
SUM OF ALL RESPONSES TO PHQ QUESTIONS 1-9: 7
3. TROUBLE FALLING OR STAYING ASLEEP: 0
2. FEELING DOWN, DEPRESSED OR HOPELESS: 1
SUM OF ALL RESPONSES TO PHQ QUESTIONS 1-9: 7
6. FEELING BAD ABOUT YOURSELF - OR THAT YOU ARE A FAILURE OR HAVE LET YOURSELF OR YOUR FAMILY DOWN: 1
8. MOVING OR SPEAKING SO SLOWLY THAT OTHER PEOPLE COULD HAVE NOTICED. OR THE OPPOSITE, BEING SO FIGETY OR RESTLESS THAT YOU HAVE BEEN MOVING AROUND A LOT MORE THAN USUAL: 1
9. THOUGHTS THAT YOU WOULD BE BETTER OFF DEAD, OR OF HURTING YOURSELF: 0
4. FEELING TIRED OR HAVING LITTLE ENERGY: 0
5. POOR APPETITE OR OVEREATING: 1
10. IF YOU CHECKED OFF ANY PROBLEMS, HOW DIFFICULT HAVE THESE PROBLEMS MADE IT FOR YOU TO DO YOUR WORK, TAKE CARE OF THINGS AT HOME, OR GET ALONG WITH OTHER PEOPLE: 1

## 2022-09-21 NOTE — PROGRESS NOTES
CC 3 month fu    HPI 63 y.o. female 3 month fu visit. Today she is having some persistent  Lower back pain. Old injury. Radiates to  R side, was on the job injury. Pain level varies from 7 to 9. No bladder or bowel issues. Present day/night. Twisting and turning  Does make it worse. Rest helps. On ibuprofen 800 mg. Nsaids and patches and muscle relaxants  In past have helped. Out of patches & muscle relaxants  Previous imaging of the back  Disc problem L4 L5      Physical Exam  Constitutional:       General: She is not in acute distress. Appearance: She is well-developed. She is not diaphoretic. HENT:      Head: Normocephalic and atraumatic. Right Ear: External ear normal.      Left Ear: External ear normal.      Nose: Nose normal.      Mouth/Throat:      Pharynx: No oropharyngeal exudate. Eyes:      General: No scleral icterus. Left eye: No discharge. Conjunctiva/sclera: Conjunctivae normal.      Pupils: Pupils are equal, round, and reactive to light. Neck:      Thyroid: No thyromegaly. Vascular: No JVD. Trachea: No tracheal deviation. Cardiovascular:      Rate and Rhythm: Normal rate and regular rhythm. Heart sounds: Normal heart sounds. No murmur heard. No friction rub. No gallop. Pulmonary:      Effort: Pulmonary effort is normal. No respiratory distress. Breath sounds: Normal breath sounds. No stridor. No wheezing or rales. Chest:      Chest wall: No tenderness. Abdominal:      General: Bowel sounds are normal. There is no distension. Palpations: Abdomen is soft. There is no mass. Tenderness: There is no abdominal tenderness. There is no guarding or rebound. Musculoskeletal:         General: No tenderness. Normal range of motion. Cervical back: Normal range of motion and neck supple. Lymphadenopathy:      Cervical: No cervical adenopathy. Skin:     General: Skin is warm and dry. Coloration: Skin is not pale. Findings: No erythema or rash. Neurological:      Mental Status: She is alert and oriented to person, place, and time. Cranial Nerves: No cranial nerve deficit. Coordination: Coordination normal.      Deep Tendon Reflexes: Reflexes are normal and symmetric. Reflexes normal.   Psychiatric:         Behavior: Behavior normal.         Thought Content: Thought content normal.         Judgment: Judgment normal.         Dequan Raphael was seen today for 3 month follow-up. Diagnoses and all orders for this visit:    Lumbar disc disease with     Pain recurrent old injury  Start new meds muscle relalxants, nsaids, lidocaine patch  Home back exercises, imaging study  -     tiZANidine (ZANAFLEX) 4 MG tablet; Take 1 tablet by mouth nightly as needed (prn at bedtime muscle relaxant)  -     lidocaine (LIDODERM) 5 %; Place 1 patch onto the skin daily for 10 days 12 hours on, 12 hours off.  -     ibuprofen (ADVIL;MOTRIN) 800 MG tablet; One tablet 3 times a day as needed  -     XR LUMBAR SPINE (2-3 VIEWS);  Future

## 2022-09-27 ENCOUNTER — TELEPHONE (OUTPATIENT)
Dept: ADMINISTRATIVE | Age: 62
End: 2022-09-27

## 2022-09-27 NOTE — TELEPHONE ENCOUNTER
Submitted PA for Lidocaine 5% patches. Key: [id number]. Via CM STATUS: APPROVED 08/28/2022; Coverage End Date:09/27/2023. Will scan letter when received. If this requires a response please respond to the pool ( [facility] [address]). Thank you please advise patient.

## 2022-09-28 ENCOUNTER — HOSPITAL ENCOUNTER (OUTPATIENT)
Age: 62
Discharge: HOME | End: 2022-09-28
Payer: MEDICARE

## 2022-09-28 ENCOUNTER — HOSPITAL ENCOUNTER (OUTPATIENT)
Dept: GENERAL RADIOLOGY | Age: 62
Discharge: HOME | End: 2022-09-28
Payer: MEDICARE

## 2022-09-28 DIAGNOSIS — M51.16 LUMBAR DISC DISEASE WITH RADICULOPATHY: ICD-10-CM

## 2022-09-28 PROCEDURE — 72100 X-RAY EXAM L-S SPINE 2/3 VWS: CPT

## 2022-09-28 NOTE — TELEPHONE ENCOUNTER
Pt informed of PA approval..  Pt also wanted to inform Dr Asaf Rosado that she had x-rays done today

## 2022-10-02 ENCOUNTER — TELEPHONE (OUTPATIENT)
Dept: PRIMARY CARE CLINIC | Age: 62
End: 2022-10-02

## 2022-10-02 NOTE — TELEPHONE ENCOUNTER
Notify pt her recent back x rays shows mild arthritis changes. Cont. Present TX    Also she should give me the name of the GI physician she wishes to see.  If she does not have one , I can give her a referral

## 2022-10-14 NOTE — PROGRESS NOTES
[facility] [facility] time___1130_________        Surgery time____1PM________    Take the following medications with a sip of water: Follow your MD/Surgeons pre-procedure instructions regarding your medications     Do not eat or drink anything after 12:00 midnight prior to your surgery. This includes water chewing gum, mints and ice chips. You may brush your teeth and gargle the morning of your surgery, but do not swallow the water     Please see your family doctor/pediatrician for a history and physical and/or concerning medications. Bring any test results/reports from your physicians office. If you are under the care of a heart doctor or specialist doctor, please be aware that you may be asked to them for clearance    You may be asked to stop blood thinners such as Coumadin, Plavix, Fragmin, Lovenox, etc., or any anti-inflammatories such as:  Aspirin, Ibuprofen, Advil, Naproxen prior to your surgery. MAY TAKE TYLENOL   We also ask that you stop any OTC medications such as fish oil, vitamin E, glucosamine, garlic, Multivitamins, COQ 10, etc.    We ask that you do not smoke 24 hours prior to surgery  We ask that you do not  drink any alcoholic beverages 24 hours prior to surgery     You must make arrangements for a responsible adult to take you home after your surgery. For your safety you will not be allowed to leave alone or drive yourself home. Your surgery will be cancelled if you do not have a ride home. Also for your safety, it is strongly suggested that someone stay with you the first 24 hours after your surgery. A parent or legal guardian must accompany a child scheduled for surgery and plan to stay at the hospital until the child is discharged. Please do not bring other children with you. For your comfort, please wear simple loose fitting clothing to the hospital.  Please do not bring valuables.     Do not wear any make-up or nail polish on your fingers or toes      For your safety, please do not wear any jewelry or body piercing's on the day of surgery. All jewelry must be removed. If you have dentures, they will be removed before going to operating room. For your convenience, we will provide you with a container. If you wear contact lenses or glasses, they will be removed, please bring a case for them. If you have a living will and a durable power of  for healthcare, please bring in a copy. As part of our patient safety program to minimize surgical site infections, we ask you to do the following:    Please notify your surgeon if you develop any illness between         now and the  day of your surgery. This includes a cough, cold, fever, sore throat, nausea,         or vomiting, and diarrhea, etc.   Please notify your surgeon if you experience dizziness, shortness         of breath or blurred vision between now and the time of your surgery. Do not shave your operative site 96 hours prior to surgery. For face and neck surgery, men may use an electric razor 48 hours   prior to surgery. You may shower the night before surgery or the morning of   your surgery with an antibacterial soap. You will need to bring a photo ID and insurance card    [facility] has an onsite pharmacy, would you like to utilize our pharmacy     If you will be staying overnight and use a C-pap machine, please bring   your C-pap to hospital     Our goal is to provide you with excellent care, therefore, visitors will be limited to two(2) in the room at a time so that we may focus on providing this care for you. Please contact pre-admission testing if you have any further questions.                  [facility] phone number:  [phone] [facility] fax number:  [phone]  Please note these are generalized instructions for all surgical cases, you may be provided with more specific instructions according to your surgery. C-Difficile admission screening and protocol:       * Admitted with diarrhea? [] YES    [x]  NO     *Prior history of C-Diff. In last 3 months? [] YES    [x]  NO     *Antibiotic use in the past 6-8 weeks? []  NO    [x]  YES                 If yes, which ANTIBIOTIC AND REASON______     *Prior hospitalization or nursing home in the last month? []  YES    [x]  NO        SAFETY FIRST. .call before you fall

## 2022-10-16 DIAGNOSIS — F32.5 MAJOR DEPRESSIVE DISORDER WITH SINGLE EPISODE, IN FULL REMISSION (HCC): ICD-10-CM

## 2022-10-17 RX ORDER — ZIPRASIDONE HYDROCHLORIDE 60 MG/1
CAPSULE ORAL
Qty: 60 CAPSULE | Refills: 6 | Status: SHIPPED | OUTPATIENT
Start: 2022-10-17

## 2022-10-19 ENCOUNTER — ANESTHESIA EVENT (OUTPATIENT)
Dept: ENDOSCOPY | Age: 62
End: 2022-10-19
Payer: MEDICARE

## 2022-10-21 ENCOUNTER — ANESTHESIA (OUTPATIENT)
Dept: ENDOSCOPY | Age: 62
End: 2022-10-21
Payer: MEDICARE

## 2022-10-21 ENCOUNTER — HOSPITAL ENCOUNTER (OUTPATIENT)
Age: 62
Setting detail: OUTPATIENT SURGERY
Discharge: HOME | End: 2022-10-21
Attending: INTERNAL MEDICINE | Admitting: INTERNAL MEDICINE
Payer: MEDICARE

## 2022-10-21 VITALS
TEMPERATURE: 97.7 F | RESPIRATION RATE: 18 BRPM | HEART RATE: 64 BPM | HEIGHT: 65 IN | BODY MASS INDEX: 28.16 KG/M2 | OXYGEN SATURATION: 100 % | SYSTOLIC BLOOD PRESSURE: 116 MMHG | DIASTOLIC BLOOD PRESSURE: 60 MMHG | WEIGHT: 169 LBS

## 2022-10-21 DIAGNOSIS — Z12.11 COLON CANCER SCREENING: ICD-10-CM

## 2022-10-21 PROCEDURE — 7100000010 HC PHASE II RECOVERY - FIRST 15 MIN: Performed by: INTERNAL MEDICINE

## 2022-10-21 PROCEDURE — 3700000001 HC ADD 15 MINUTES (ANESTHESIA): Performed by: INTERNAL MEDICINE

## 2022-10-21 PROCEDURE — 2500000003 HC RX 250 WO HCPCS: Performed by: NURSE ANESTHETIST, CERTIFIED REGISTERED

## 2022-10-21 PROCEDURE — 3700000000 HC ANESTHESIA ATTENDED CARE: Performed by: INTERNAL MEDICINE

## 2022-10-21 PROCEDURE — 7100000001 HC PACU RECOVERY - ADDTL 15 MIN: Performed by: INTERNAL MEDICINE

## 2022-10-21 PROCEDURE — 7100000011 HC PHASE II RECOVERY - ADDTL 15 MIN: Performed by: INTERNAL MEDICINE

## 2022-10-21 PROCEDURE — 7100000000 HC PACU RECOVERY - FIRST 15 MIN: Performed by: INTERNAL MEDICINE

## 2022-10-21 PROCEDURE — 6360000002 HC RX W HCPCS: Performed by: NURSE ANESTHETIST, CERTIFIED REGISTERED

## 2022-10-21 PROCEDURE — 3609010600 HC COLONOSCOPY POLYPECTOMY SNARE/COLD BIOPSY: Performed by: INTERNAL MEDICINE

## 2022-10-21 PROCEDURE — 2580000003 HC RX 258: Performed by: ANESTHESIOLOGY

## 2022-10-21 PROCEDURE — 88305 TISSUE EXAM BY PATHOLOGIST: CPT

## 2022-10-21 PROCEDURE — 2709999900 HC NON-CHARGEABLE SUPPLY: Performed by: INTERNAL MEDICINE

## 2022-10-21 RX ORDER — SODIUM CHLORIDE 9 MG/ML
INJECTION, SOLUTION INTRAVENOUS CONTINUOUS
Status: DISCONTINUED | OUTPATIENT
Start: 2022-10-21 | End: 2022-10-21 | Stop reason: HOSPADM

## 2022-10-21 RX ORDER — ONDANSETRON 2 MG/ML
4 INJECTION INTRAMUSCULAR; INTRAVENOUS
Status: CANCELLED | OUTPATIENT
Start: 2022-10-21 | End: 2022-10-22

## 2022-10-21 RX ORDER — PROPOFOL 10 MG/ML
INJECTION, EMULSION INTRAVENOUS CONTINUOUS PRN
Status: DISCONTINUED | OUTPATIENT
Start: 2022-10-21 | End: 2022-10-21 | Stop reason: SDUPTHER

## 2022-10-21 RX ORDER — SODIUM CHLORIDE 0.9 % (FLUSH) 0.9 %
5-40 SYRINGE (ML) INJECTION PRN
Status: DISCONTINUED | OUTPATIENT
Start: 2022-10-21 | End: 2022-10-21 | Stop reason: HOSPADM

## 2022-10-21 RX ORDER — SODIUM CHLORIDE 0.9 % (FLUSH) 0.9 %
5-40 SYRINGE (ML) INJECTION EVERY 12 HOURS SCHEDULED
Status: CANCELLED | OUTPATIENT
Start: 2022-10-21

## 2022-10-21 RX ORDER — SODIUM CHLORIDE 9 MG/ML
INJECTION, SOLUTION INTRAVENOUS PRN
Status: CANCELLED | OUTPATIENT
Start: 2022-10-21

## 2022-10-21 RX ORDER — DIPHENHYDRAMINE HYDROCHLORIDE 50 MG/ML
12.5 INJECTION INTRAMUSCULAR; INTRAVENOUS
Status: CANCELLED | OUTPATIENT
Start: 2022-10-21 | End: 2022-10-22

## 2022-10-21 RX ORDER — SODIUM CHLORIDE 0.9 % (FLUSH) 0.9 %
5-40 SYRINGE (ML) INJECTION PRN
Status: CANCELLED | OUTPATIENT
Start: 2022-10-21

## 2022-10-21 RX ORDER — SODIUM CHLORIDE 9 MG/ML
INJECTION, SOLUTION INTRAVENOUS PRN
Status: DISCONTINUED | OUTPATIENT
Start: 2022-10-21 | End: 2022-10-21 | Stop reason: HOSPADM

## 2022-10-21 RX ORDER — SODIUM CHLORIDE 0.9 % (FLUSH) 0.9 %
5-40 SYRINGE (ML) INJECTION EVERY 12 HOURS SCHEDULED
Status: DISCONTINUED | OUTPATIENT
Start: 2022-10-21 | End: 2022-10-21 | Stop reason: HOSPADM

## 2022-10-21 RX ORDER — LIDOCAINE HYDROCHLORIDE 20 MG/ML
INJECTION, SOLUTION EPIDURAL; INFILTRATION; INTRACAUDAL; PERINEURAL PRN
Status: DISCONTINUED | OUTPATIENT
Start: 2022-10-21 | End: 2022-10-21 | Stop reason: SDUPTHER

## 2022-10-21 RX ADMIN — SODIUM CHLORIDE: 9 INJECTION, SOLUTION INTRAVENOUS at 11:44

## 2022-10-21 RX ADMIN — LIDOCAINE HYDROCHLORIDE 100 MG: 20 INJECTION, SOLUTION EPIDURAL; INFILTRATION; INTRACAUDAL; PERINEURAL at 13:06

## 2022-10-21 RX ADMIN — PROPOFOL 150 MCG/KG/MIN: 10 INJECTION, EMULSION INTRAVENOUS at 13:06

## 2022-10-21 ASSESSMENT — PAIN - FUNCTIONAL ASSESSMENT: PAIN_FUNCTIONAL_ASSESSMENT: 0-10

## 2022-10-21 ASSESSMENT — ENCOUNTER SYMPTOMS: SHORTNESS OF BREATH: 0

## 2022-10-21 ASSESSMENT — PAIN SCALES - GENERAL: PAINLEVEL_OUTOF10: 0

## 2022-10-21 ASSESSMENT — LIFESTYLE VARIABLES: SMOKING_STATUS: 0

## 2022-10-21 NOTE — ANESTHESIA PRE PROCEDURE
Department of Anesthesiology  Preprocedure Note       Name:  Gloria Raygoza   Age:  63 y.o.  :  1960                                          MRN:  [id number]         Date:  10/21/2022      Surgeon: Fernando August):  Mahala Hodgkin, MD    Procedure: Procedure(s):  COLONOSCOPY    Medications prior to admission:   Prior to Admission medications    Medication Sig Start Date End Date Taking?  Authorizing Provider   Iron-Vitamins (GERITOL COMPLETE PO) Take 1 tablet by mouth daily   Yes Historical Provider, MD   ziprasidone (GEODON) 60 MG capsule TAKE 1 CAPSULE BY MOUTH TWICE DAILY WITH MEALS 10/17/22   Walt Yun MD   tiZANidine (ZANAFLEX) 4 MG tablet Take 1 tablet by mouth nightly as needed (prn at bedtime muscle relaxant) 22   Walt Yun MD   ibuprofen (ADVIL;MOTRIN) 800 MG tablet One tablet 3 times a day as needed 22   Walt Yun MD   quinapril (ACCUPRIL) 10 MG tablet TAKE 1 TABLET BY MOUTH EVERY NIGHT 22   Walt Yun MD   fluticasone (FLONASE) 50 MCG/ACT nasal spray SHAKE LIQUID AND USE 2 SPRAYS IN EACH NOSTRIL DAILY  Patient taking differently: 2 sprays by Nasal route as needed SHAKE LIQUID AND USE 2 SPRAYS IN EACH NOSTRIL DAILY AS NEEDED 6/15/22   Walt Yun MD   hydroCHLOROthiazide (MICROZIDE) 12.5 MG capsule Take 1 capsule by mouth every morning 6/15/22   Walt Yun MD   loratadine (CLARITIN) 10 MG tablet Take 1 tablet by mouth daily  Patient taking differently: Take 10 mg by mouth at bedtime 6/15/22   Walt Yun MD   sertraline (ZOLOFT) 100 MG tablet Take 2 tablets by mouth daily 6/15/22   Walt Yun MD   spironolactone (ALDACTONE) 50 MG tablet TAKE 1 TABLET BY MOUTH TWICE DAILY 6/15/22   Walt Yun MD   topiramate (TOPAMAX) 100 MG tablet TAKE 1 TABLET BY MOUTH TWICE DAILY 6/15/22   Walt Yun MD   traZODone (DESYREL) 50 MG tablet TAKE 1 TABLET BY MOUTH EVERY NIGHT 6/15/22   Walt Yun MD       Current medications:    Current Facility-Administered Medications   Medication Dose Route Frequency Provider Last Rate Last Admin    0.9 % sodium chloride infusion   IntraVENous Continuous Michael Snow  mL/hr at 10/21/22 1144 New Bag at 10/21/22 1144    sodium chloride flush 0.9 % injection 5-40 mL  5-40 mL IntraVENous 2 times per day Michael Snow MD        sodium chloride flush 0.9 % injection 5-40 mL  5-40 mL IntraVENous PRN Michael Snow MD        0.9 % sodium chloride infusion   IntraVENous PRN Michael Snow MD           Allergies:     Allergies   Allergen Reactions    Lactose Intolerance (Gi)        Problem List:    Patient Active Problem List   Diagnosis Code    Low back pain M54.50    Hypertension I10    Menopausal symptoms N95.1    Major depressive disorder with single episode, in full remission ([address] F32.5       Past Medical History:        Diagnosis Date    B[facility] 1 disorder [facility] [address]     Chronic low back pain     Colon polyp     Depression     Hearing loss of left ear     Hypertension     Migraine     Osteoarthritis     Plantar fasciitis     BILATERAL FEET    PTSD (post-traumatic stress disorder)     Wears hearing aid in left ear     PATIENT STATES SHE ALWAYS DOESN'T WEAR IT       Past Surgical History:        Procedure Laterality Date    BREAST BIOPSY  01/01/1986    Dr. Umesh Tai  at [address]      benign    WISDOM TOOTH EXTRACTION         Social History:    Social History     Tobacco Use    Smoking status: Never    Smokeless tobacco: Never   Substance Use Topics    Alcohol use: Yes     Comment: occasionally WINE                                Counseling given: Not Answered      Vital Signs (Current):   Vitals:    10/14/22 0846 10/21/22 1139   BP:  125/67   Pulse:  (!) 47   Resp:  18   Temp:  (!) 96.5 °F (35.8 °C)   TempSrc:  Temporal   SpO2:  100%   Weight: 169 lb (76.7 kg)    Height: 5' 5\" (1.651 m)                                               BP Readings from Last 3 Encounters:   10/21/22 125/67   09/21/22 117/74 06/15/22 133/83       NPO Status: Time of last liquid consumption: 2000                        Time of last solid consumption: 0600                        Date of last liquid consumption: 10/20/22                        Date of last solid food consumption: 10/20/22    BMI:   Wt Readings from Last 3 Encounters:   10/14/22 169 lb (76.7 kg)   09/21/22 169 lb (76.7 kg)   06/15/22 166 lb (75.3 kg)     Body mass index is 28.12 kg/m². CBC:   Lab Results   Component Value Date/Time    WBC 9.8 06/15/2022 11:41 AM    RBC 5.21 06/15/2022 11:41 AM    HGB 11.9 06/15/2022 11:41 AM    HCT 38.0 06/15/2022 11:41 AM    MCV 73.0 06/15/2022 11:41 AM    RDW 16.4 06/15/2022 11:41 AM     06/15/2022 11:41 AM       CMP:   Lab Results   Component Value Date/Time     06/15/2022 11:41 AM    K 3.9 06/15/2022 11:41 AM     06/15/2022 11:41 AM    CO2 20 06/15/2022 11:41 AM    BUN 18 06/15/2022 11:41 AM    CREATININE 0.9 06/15/2022 11:41 AM    GFRAA >60 06/15/2022 11:41 AM    GFRAA >60 12/19/2012 01:18 PM    AGRATIO 1.7 03/08/2021 10:57 AM    LABGLOM >60 06/15/2022 11:41 AM    GLUCOSE 81 06/15/2022 11:41 AM    PROT 7.4 03/08/2021 10:57 AM    CALCIUM 10.0 06/15/2022 11:41 AM    BILITOT 0.3 03/08/2021 10:57 AM    ALKPHOS 113 03/08/2021 10:57 AM    AST 17 06/15/2022 11:41 AM    ALT 47 03/08/2021 10:57 AM       POC Tests: No results for input(s): POCGLU, POCNA, POCK, POCCL, POCBUN, POCHEMO, POCHCT in the last 72 hours.     Coags: No results found for: PROTIME, INR, APTT    HCG (If Applicable): No results found for: PREGTESTUR, PREGSERUM, HCG, HCGQUANT     ABGs: No results found for: PHART, PO2ART, EFV9QAM, UQV5UXB, BEART, P5NGBNDU     Type & Screen (If Applicable):  No results found for: LABABO, LABRH    Drug/Infectious Status (If Applicable):  No results found for: HIV, HEPCAB    COVID-19 Screening (If Applicable): No results found for: COVID19        Anesthesia Evaluation  Patient summary reviewed no history of anesthetic complications:   Airway: Mallampati: II  TM distance: >3 FB   Neck ROM: full  Mouth opening: > = 3 FB   Dental: normal exam         Pulmonary:Negative Pulmonary ROS       (-) shortness of breath and not a current smoker          Patient did not smoke on day of surgery. Cardiovascular:    (+) hypertension:,     (-) pacemaker, past MI, CABG/stent and  angina       Beta Blocker:  Not on Beta Blocker         Neuro/Psych:   (+) headaches:, psychiatric history: stable with treatment   (-) seizures and CVA           GI/Hepatic/Renal: Neg GI/Hepatic/Renal ROS       (-) liver disease and no renal disease       Endo/Other: Negative Endo/Other ROS       (-) diabetes mellitus, hypothyroidism, hyperthyroidism               Abdominal:             Vascular: negative vascular ROS. Other Findings:           Anesthesia Plan      MAC     ASA 2       Induction: intravenous. Anesthetic plan and risks discussed with patient. Plan discussed with CRNA. This pre-anesthesia assessment may be used as a history and physical.    DOS STAFF ADDENDUM:    Pt seen and examined, chart reviewed (including anesthesia, drug and allergy history). No interval changes to history and physical examination. Anesthetic plan, risks, benefits, alternatives, and personnel involved discussed with patient. Patient verbalized an understanding and agrees to proceed.       Danna Begum MD  October 21, 2022  12:51 PM

## 2022-10-21 NOTE — OP NOTE
Colonoscopy Procedure Note      Patient: Luis Johns  : 1960  Acct#:     Procedure: Colonoscopy with polypectomy (cold snare)    Date:  10/21/2022    Surgeon:  Kelsey Haley MD    Referring Physician:  Julissa Montana MD    Preoperative Diagnosis:  history of colon polyps, last colonoscopy 2018 with removal of tubular adenomas x 2, no family history of colon cancer    Postoperative Diagnosis:    1) small ascending colon polyp x 1 resected with cold snare  2) otherwise normal colonoscopy    Consent:  The patient or their legal guardian has signed a consent, and is aware of the potential risks, benefits, alternatives, and potential complications of this procedure. These include, but are not limited to hemorrhage, bleeding, post procedural pain, perforation, phlebitis, aspiration, hypotension, hypoxia, cardiovascular events such as arryhthmia, and possibly death. Additionally, the possibility of missed colonic polyps and interval colon cancer was discussed in the consent. Anesthesia:  MAC    Procedure: An informed consent was obtained from the patient after explanation of indications, benefits, possible risks and complications of the procedure. The patient was then taken to the endoscopy suite, placed in the left lateral decubitus position, and the above IV anesthesia was administered. The Olympus video colonoscope was placed in the patient's rectum under digital direction and advanced to the cecum. The cecum was identified by characteristic anatomy. The preparation was adequate after extensive cleaning. The ileocecal valve was identified. The scope was then withdrawn back through the cecum, ascending, transverse, descending, sigmoid colon, and rectum. Careful circumferential examination of the mucosa in these areas demonstrated:    Digital rectal exam was normal. There was a small ascending colon polyp resected with cold snare and retrieved.  Remainder of the colon was normal including retroflex view in the rectum. The colon was decompressed and the scope was withdrawn from the patient. The patient tolerated the procedure well and was taken to the PACU in good condition. Estimated Blood Loss (mL): <47 mL    Complications: None    Impression:    1) small ascending colon polyp x 1 resected with cold snare  2) otherwise normal colonoscopy    Recommendations:  Await pathology. Repeat colonoscopy in five years.     Kadeem Bernal MD  [facility]  10/21/2022  [phone]

## 2022-10-21 NOTE — DISCHARGE INSTRUCTIONS
Impression:    1) small ascending colon polyp x 1 resected with cold snare  2) otherwise normal colonoscopy    Recommendations:  Await pathology. Repeat colonoscopy in five years. Discharge Instructions for Colonoscopy     Colonoscopy is a visual exam of the lining of the large intestine, also called the bowel or colon, with a colonoscope. A colonoscope is a flexible tube with a light and a viewing device. It allows the doctor to view the inside of the colon through a tiny video camera. Colonoscopy is performed for many reasons: unexplained anemia , pain, diarrhea , bloody stools, cancer screening, among many other reasons. Complications from a colonoscopy are rare. Some possible serious complications include perforated bowel (which might require surgery) and bleeding (which could require blood transfusion ). Minor complications include bloating, gas, and cramping that can last for 1-2 days after the procedure. Because air is put into your colon during the procedure, it is normal to pass large amounts of air from your rectum. You may not have a bowel movement for 1-3 days after the procedure. What You Will Need:  Someone to drive you home after the procedure     Steps to Take:  [address] when you get home. Because the sedative will make you drowsy, don't drive, operate machinery, or make important decisions the day of the procedure. Feelings of bloating, gas, or cramping may persist for 24 hours. Diet -  Try sips of water first. If tolerated, resume bland food (scrambled eggs, toast, soup) first.  If tolerated, resume regular diet or the diet recommended by your physician. Do not drink alcohol for 24 hours. Physical Activity -  Ask your doctor when you will be able to return to work. Do not drive, operate heavy machinery, or do activities that require coordination or balance for 24 hours.    Otherwise, return to your normal routine as soon as you are comfortable to do so, which is usually the next day after the procedure. Medications - When taking medications, it's important to: Take your medication as directed, not more, not less, not at a different time. Do not stop taking them without consulting your healthcare provider. Don't share them with anyone else. Know what effects and side effects to expect, and report them to your healthcare provider. If you are taking more than one drug, even if it is an over-the-counter medication, herb, or dietary supplement, be sure to check with a physician or pharmacist about drug interactions. Plan ahead for refills so you don't run out. Lifestyle Changes - The results of your colonoscopy will determine if any lifestyle changes are necessary. Follow-up:  The doctor will usually give you a preliminary report after the medication wears off and you are more alert. The results from a biopsy can take as long as 1-2 weeks to be completed. Schedule a follow-up appointment as directed by your doctor. You should schedule a follow-up colonoscopy as recommended by your doctor. Call Your Doctor If Any of the Following Occurs:  Bleeding from your rectum; notify your doctor if you pass a teaspoonful or more of blood   Black, tarry stools   Severe abdominal pain   Hard, swollen abdomen   Signs of infection, including fever or chills   Inability to pass gas or stool   Coughing, shortness of breath, chest pain, severe nausea or vomiting     In case of an emergency, call 911 immediately. Carlos Cohen MD    With questions or concerns call Dr Anselmo Whitmore at .

## 2022-10-21 NOTE — H&P
[address]   Pre-operative History and Physical    Patient: Kisha Ge  : 1960  Acct#:     HISTORY OF PRESENT ILLNESS:    The patient is a 63 y.o. female who presents for colonoscopy    Indications: history of colon polyps, last colonoscopy 2018 with removal of tubular adenomas x 2, no family history of colon cancer    Past Medical History:        Diagnosis Date    Bipolar 1 disorder ([address]     Chronic low back pain     Colon polyp     Depression     Hearing loss of left ear     Hypertension     Migraine     Osteoarthritis     Plantar fasciitis     BILATERAL FEET    PTSD (post-traumatic stress disorder)     Wears hearing aid in left ear     PATIENT STATES SHE ALWAYS DOESN'T WEAR IT      Past Surgical History:        Procedure Laterality Date    BREAST BIOPSY  1986    Dr. Ward Patel  at [address] [place] [address]      benign    WISDOM TOOTH EXTRACTION        Medications Prior to Admission:   No current facility-administered medications on file prior to encounter.      Current Outpatient Medications on File Prior to Encounter   Medication Sig Dispense Refill    Iron-Vitamins (GERITOL COMPLETE PO) Take 1 tablet by mouth daily      tiZANidine (ZANAFLEX) 4 MG tablet Take 1 tablet by mouth nightly as needed (prn at bedtime muscle relaxant) 30 tablet 0    ibuprofen (ADVIL;MOTRIN) 800 MG tablet One tablet 3 times a day as needed 120 tablet 0    quinapril (ACCUPRIL) 10 MG tablet TAKE 1 TABLET BY MOUTH EVERY NIGHT 90 tablet 1    fluticasone (FLONASE) 50 MCG/ACT nasal spray SHAKE LIQUID AND USE 2 SPRAYS IN EACH NOSTRIL DAILY (Patient taking differently: 2 sprays by Nasal route as needed SHAKE LIQUID AND USE 2 SPRAYS IN EACH NOSTRIL DAILY AS NEEDED) 48 g 1    hydroCHLOROthiazide (MICROZIDE) 12.5 MG capsule Take 1 capsule by mouth every morning 90 capsule 1    loratadine (CLARITIN) 10 MG tablet Take 1 tablet by mouth daily (Patient taking differently: Take 10 mg by mouth at bedtime) 30 tablet 1    sertraline (ZOLOFT) 100 MG tablet Take 2 tablets by mouth daily 180 tablet 1    spironolactone (ALDACTONE) 50 MG tablet TAKE 1 TABLET BY MOUTH TWICE DAILY 180 tablet 1    topiramate (TOPAMAX) 100 MG tablet TAKE 1 TABLET BY MOUTH TWICE DAILY 180 tablet 1    traZODone (DESYREL) 50 MG tablet TAKE 1 TABLET BY MOUTH EVERY NIGHT 90 tablet 1        Allergies:  Lactose intolerance (gi)    Social History:   Social History     Socioeconomic History    Marital status:      Spouse name: Not on file    Number of children: Not on file    Years of education: Not on file    Highest education level: Not on file   Occupational History    Not on file   Tobacco Use    Smoking status: Never    Smokeless tobacco: Never   Vaping Use    Vaping Use: Never used   Substance and Sexual Activity    Alcohol use: Yes     Comment: occasionally WINE    Drug use: Never    Sexual activity: Never   Other Topics Concern    Not on file   Social History Narrative    Not on file     Social Determinants of Health     Financial Resource Strain: Low Risk     Difficulty of Paying Living Expenses: Not very hard   Food Insecurity: No Food Insecurity    Worried About Running Out of Food in the Last Year: Never true    Ran Out of Food in the Last Year: Never true   Transportation Needs: Not on file   Physical Activity: Not on file   Stress: Not on file   Social Connections: Not on file   Intimate Partner Violence: Not on file   Housing Stability: Not on file      Family History:       Problem Relation Age of Onset    Diabetes Mother     High Blood Pressure Mother     High Cholesterol Mother     Diabetes Sister     High Blood Pressure Sister     Substance Abuse Sister     Heart Disease Brother     Diabetes Brother     High Blood Pressure Brother     Substance Abuse Brother     Heart Disease Brother     Diabetes Brother     Rheum Arthritis Neg Hx     Osteoarthritis Neg Hx     Heart Failure Neg Hx     Hypertension Neg Hx     Migraines Neg Hx     Ovarian Cancer Neg Hx Rashes/Skin Problems Neg Hx     Seizures Neg Hx     Stroke Neg Hx     Thyroid Disease Neg Hx         PHYSICAL EXAM:      /67   Pulse (!) 47   Temp (!) 96.5 °F (35.8 °C) (Temporal)   Resp 18   Ht 5' 5\" (1.651 m)   Wt 169 lb (76.7 kg)   SpO2 100%   BMI 28.12 kg/m²  I        Heart:  Normal apical impulse, regular rate and rhythm, normal S1 and S2, no S3 or S4, and no murmur noted    Lungs:  No increased work of breathing, good air exchange, clear to auscultation bilaterally, no crackles or wheezing    Abdomen:  No scars, normal bowel sounds, soft, non-distended, non-tender, no masses palpated, no hepatosplenomegally      ASA Class  ASA 2 - Patient with mild systemic disease with no functional limitations    Mallampati Class: II      ASSESSMENT AND PLAN:    1. Patient is a suitable candidate for endoscopic procedure and attendant anesthesia  2. Risks, benefits, alternatives of procedure discussed in detail with patient including risks of bleeding, infection, perforation, risks of sedation, risks of missed lesions. The patient wishes to proceed.

## 2022-10-21 NOTE — ANESTHESIA POSTPROCEDURE EVALUATION
Department of Anesthesiology  Postprocedure Note    Patient: Kanu Luo  MRN: [id number]  YOB: 1960  Date of evaluation: 10/21/2022      Procedure Summary     Date: 10/21/22 Room / Location: [address] [place] [address]    Anesthesia Start: 3808 Anesthesia Stop: 9107    Procedure: COLONOSCOPY POLYPECTOMY SNARE/COLD BIOPSY Diagnosis:       Colon cancer screening      (COLON CANCER SCREENING)    Surgeons: Latia Davey MD Responsible Provider: Toño Lainez MD    Anesthesia Type: MAC ASA Status: 2          Anesthesia Type: No value filed.     Toi Phase I: Toi Score: 9    Toi Phase II: Toi Score: 10      Anesthesia Post Evaluation    Patient location during evaluation: bedside  Patient participation: complete - patient participated  Level of consciousness: awake and alert  Pain score: 0  Nausea & Vomiting: no nausea  Complications: no  Cardiovascular status: hemodynamically stable  Respiratory status: acceptable  Hydration status: stable

## 2022-10-27 DIAGNOSIS — Z12.9 CANCER SCREENING: ICD-10-CM

## 2022-10-27 DIAGNOSIS — Z12.9 CANCER SCREENING: Primary | ICD-10-CM

## 2022-10-27 LAB
CONTROL: NORMAL
HEMOCCULT STL QL: NORMAL

## 2022-10-27 PROCEDURE — 82274 ASSAY TEST FOR BLOOD FECAL: CPT | Performed by: FAMILY MEDICINE

## 2022-11-04 ENCOUNTER — OFFICE VISIT (OUTPATIENT)
Dept: PRIMARY CARE CLINIC | Age: 62
End: 2022-11-04
Payer: MEDICARE

## 2022-11-04 VITALS
DIASTOLIC BLOOD PRESSURE: 69 MMHG | BODY MASS INDEX: 28.79 KG/M2 | WEIGHT: 173 LBS | TEMPERATURE: 97.3 F | OXYGEN SATURATION: 99 % | SYSTOLIC BLOOD PRESSURE: 139 MMHG | HEART RATE: 49 BPM

## 2022-11-04 DIAGNOSIS — M51.16 LUMBAR DISC DISEASE WITH RADICULOPATHY: ICD-10-CM

## 2022-11-04 DIAGNOSIS — M51.36 DDD (DEGENERATIVE DISC DISEASE), LUMBAR: Primary | ICD-10-CM

## 2022-11-04 PROCEDURE — 99213 OFFICE O/P EST LOW 20 MIN: CPT | Performed by: FAMILY MEDICINE

## 2022-11-04 PROCEDURE — 3078F DIAST BP <80 MM HG: CPT | Performed by: FAMILY MEDICINE

## 2022-11-04 PROCEDURE — 3074F SYST BP LT 130 MM HG: CPT | Performed by: FAMILY MEDICINE

## 2022-11-04 RX ORDER — IBUPROFEN 800 MG/1
TABLET ORAL
Qty: 120 TABLET | Refills: 0 | Status: SHIPPED | OUTPATIENT
Start: 2022-11-04

## 2022-11-04 RX ORDER — TIZANIDINE 4 MG/1
TABLET ORAL
Qty: 90 TABLET | Refills: 0 | Status: SHIPPED | OUTPATIENT
Start: 2022-11-04

## 2022-11-04 NOTE — PROGRESS NOTES
CC fu for back pain  And x rays        Jericho العراقي (:  1960) is a 63 y.o. female,Established patient, here for evaluation of the following chief complaint(s):  Follow-up (Pt is here to follow up on x-rays)         ASSESSMENT/PLAN:  1. DDD (degenerative disc disease), lumbar    She is having some pains in  Back and lower part and muscle spasms  Her recent xrays due show  Deg changes mild L3 L4 area  2. Lumbar disc disease with radiculopathy  See above comments  No bladder or bowel issues  -     ibuprofen (ADVIL;MOTRIN) 800 MG tablet; One tablet 3 times a day as needed, Disp-120 tablet, R-0Normal  -     tiZANidine (ZANAFLEX) 4 MG tablet; Take one tab TID prn back pain and muscle spasms, Disp-90 tablet, R-0Normal      No follow-ups on file. Subjective   SUBJECTIVE/OBJECTIVE:  HPI 63 y.o. female fu for lower back pain  With leg radiation. Chronic no recent injury. Present day and night. Not much better. Worse with turning, sitting, standing, bending  No bladder or bladder issues        Review of Systems see hpi       Objective   Physical Exam  Constitutional:       General: She is not in acute distress. Appearance: She is well-developed. She is not diaphoretic. HENT:      Head: Normocephalic and atraumatic. Right Ear: External ear normal.      Left Ear: External ear normal.      Nose: Nose normal.      Mouth/Throat:      Pharynx: No oropharyngeal exudate. Eyes:      General: No scleral icterus. Left eye: No discharge. Conjunctiva/sclera: Conjunctivae normal.      Pupils: Pupils are equal, round, and reactive to light. Neck:      Thyroid: No thyromegaly. Vascular: No JVD. Trachea: No tracheal deviation. Cardiovascular:      Rate and Rhythm: Normal rate and regular rhythm. Heart sounds: Normal heart sounds. No murmur heard. No friction rub. No gallop. Pulmonary:      Effort: Pulmonary effort is normal. No respiratory distress.       Breath sounds: Normal breath sounds. No stridor. No wheezing or rales. Chest:      Chest wall: No tenderness. Abdominal:      General: Bowel sounds are normal. There is no distension. Palpations: Abdomen is soft. There is no mass. Tenderness: There is no abdominal tenderness. There is no guarding or rebound. Musculoskeletal:         General: No tenderness. Normal range of motion. Cervical back: Normal range of motion and neck supple. Comments: Tenderness paraspinal muscles of the lower back area    SLR NEG BILATERAL   Lymphadenopathy:      Cervical: No cervical adenopathy. Skin:     General: Skin is warm and dry. Coloration: Skin is not pale. Findings: No erythema or rash. Neurological:      Mental Status: She is alert and oriented to person, place, and time. Cranial Nerves: No cranial nerve deficit. Coordination: Coordination normal.      Deep Tendon Reflexes: Reflexes are normal and symmetric. Reflexes normal.   Psychiatric:         Behavior: Behavior normal.         Thought Content: Thought content normal.         Judgment: Judgment normal.          On this date 11/4/2022 I have spent 20 minutes reviewing previous notes, test results and face to face with the patient discussing the diagnosis and importance of compliance with the treatment plan as well as documenting on the day of the visit. An electronic signature was used to authenticate this note.     --Christiano Baumann MD

## 2022-11-21 DIAGNOSIS — F51.01 PRIMARY INSOMNIA: ICD-10-CM

## 2022-11-21 DIAGNOSIS — I10 ESSENTIAL HYPERTENSION: ICD-10-CM

## 2022-11-21 NOTE — TELEPHONE ENCOUNTER
Medication:   Requested Prescriptions     Pending Prescriptions Disp Refills    hydroCHLOROthiazide (MICROZIDE) 12.5 MG capsule [Pharmacy Med Name: HYDROCHLOROTHIAZIDE 12.5MG CAPSULES] 90 capsule 1     Sig: TAKE 1 CAPSULE BY MOUTH EVERY MORNING    traZODone (DESYREL) 50 MG tablet 90 tablet 1     Sig: TAKE 1 TABLET BY MOUTH EVERY NIGHT        Last Filled:      Patient Phone Number: [phone] (home)     Last appt: 11/4/2022   Next appt: 12/28/2022    Last OARRS: No flowsheet data found.

## 2022-11-22 RX ORDER — HYDROCHLOROTHIAZIDE 12.5 MG/1
12.5 CAPSULE, GELATIN COATED ORAL EVERY MORNING
Qty: 90 CAPSULE | Refills: 1 | Status: SHIPPED | OUTPATIENT
Start: 2022-11-22

## 2022-11-22 RX ORDER — TRAZODONE HYDROCHLORIDE 50 MG/1
TABLET ORAL
Qty: 90 TABLET | Refills: 1 | Status: SHIPPED | OUTPATIENT
Start: 2022-11-22

## 2022-12-07 ENCOUNTER — TELEPHONE (OUTPATIENT)
Dept: PRIMARY CARE CLINIC | Age: 62
End: 2022-12-07

## 2022-12-07 NOTE — TELEPHONE ENCOUNTER
PT called in wanting to know if Dr. Santiago Hickey can call something into her pharmacy for her. She says for the last week she's been experiencing a cough and a headache. No fever, no sore throat. [facility] [address]      Best call back number: [phone]

## 2022-12-12 DIAGNOSIS — J30.2 SEASONAL ALLERGIC RHINITIS, UNSPECIFIED TRIGGER: ICD-10-CM

## 2022-12-12 DIAGNOSIS — M51.16 LUMBAR DISC DISEASE WITH RADICULOPATHY: ICD-10-CM

## 2022-12-12 RX ORDER — LORATADINE 10 MG/1
10 TABLET ORAL DAILY
Qty: 30 TABLET | Refills: 1 | Status: SHIPPED | OUTPATIENT
Start: 2022-12-12 | End: 2022-12-28 | Stop reason: SDUPTHER

## 2022-12-12 RX ORDER — TIZANIDINE 4 MG/1
TABLET ORAL
Qty: 90 TABLET | Refills: 1 | Status: SHIPPED | OUTPATIENT
Start: 2022-12-12

## 2022-12-28 ENCOUNTER — OFFICE VISIT (OUTPATIENT)
Dept: PRIMARY CARE CLINIC | Age: 62
End: 2022-12-28
Payer: MEDICARE

## 2022-12-28 VITALS
WEIGHT: 178 LBS | HEART RATE: 56 BPM | SYSTOLIC BLOOD PRESSURE: 118 MMHG | BODY MASS INDEX: 29.66 KG/M2 | DIASTOLIC BLOOD PRESSURE: 76 MMHG | OXYGEN SATURATION: 99 % | HEIGHT: 65 IN | TEMPERATURE: 97.2 F

## 2022-12-28 DIAGNOSIS — I10 ESSENTIAL HYPERTENSION: ICD-10-CM

## 2022-12-28 DIAGNOSIS — L68.0 HIRSUTIES: ICD-10-CM

## 2022-12-28 DIAGNOSIS — M51.16 LUMBAR DISC DISEASE WITH RADICULOPATHY: ICD-10-CM

## 2022-12-28 DIAGNOSIS — F51.01 PRIMARY INSOMNIA: ICD-10-CM

## 2022-12-28 DIAGNOSIS — G43.109 MIGRAINE WITH AURA AND WITHOUT STATUS MIGRAINOSUS, NOT INTRACTABLE: ICD-10-CM

## 2022-12-28 DIAGNOSIS — F32.5 MAJOR DEPRESSIVE DISORDER WITH SINGLE EPISODE, IN FULL REMISSION (HCC): ICD-10-CM

## 2022-12-28 DIAGNOSIS — J30.2 SEASONAL ALLERGIC RHINITIS, UNSPECIFIED TRIGGER: ICD-10-CM

## 2022-12-28 DIAGNOSIS — Z00.00 MEDICARE ANNUAL WELLNESS VISIT, SUBSEQUENT: Primary | ICD-10-CM

## 2022-12-28 LAB
ANION GAP SERPL CALCULATED.3IONS-SCNC: 12 MMOL/L (ref 3–16)
BUN BLDV-MCNC: 18 MG/DL (ref 7–20)
CALCIUM SERPL-MCNC: 10.1 MG/DL (ref 8.3–10.6)
CHLORIDE BLD-SCNC: 107 MMOL/L (ref 99–110)
CO2: 23 MMOL/L (ref 21–32)
CREAT SERPL-MCNC: 0.7 MG/DL (ref 0.6–1.2)
GFR SERPL CREATININE-BSD FRML MDRD: >60 ML/MIN/{1.73_M2}
GLUCOSE BLD-MCNC: 93 MG/DL (ref 70–99)
HCT VFR BLD CALC: 40.4 % (ref 36–48)
HEMOGLOBIN: 12 G/DL (ref 12–16)
MCH RBC QN AUTO: 22.5 PG (ref 26–34)
MCHC RBC AUTO-ENTMCNC: 29.7 G/DL (ref 31–36)
MCV RBC AUTO: 75.7 FL (ref 80–100)
PDW BLD-RTO: 16.1 % (ref 12.4–15.4)
PLATELET # BLD: 236 K/UL (ref 135–450)
PMV BLD AUTO: 10.2 FL (ref 5–10.5)
POTASSIUM SERPL-SCNC: 4 MMOL/L (ref 3.5–5.1)
RBC # BLD: 5.33 M/UL (ref 4–5.2)
SODIUM BLD-SCNC: 142 MMOL/L (ref 136–145)
WBC # BLD: 7.8 K/UL (ref 4–11)

## 2022-12-28 PROCEDURE — G0439 PPPS, SUBSEQ VISIT: HCPCS | Performed by: FAMILY MEDICINE

## 2022-12-28 PROCEDURE — 3078F DIAST BP <80 MM HG: CPT | Performed by: FAMILY MEDICINE

## 2022-12-28 PROCEDURE — 3074F SYST BP LT 130 MM HG: CPT | Performed by: FAMILY MEDICINE

## 2022-12-28 PROCEDURE — 99212 OFFICE O/P EST SF 10 MIN: CPT | Performed by: FAMILY MEDICINE

## 2022-12-28 RX ORDER — SERTRALINE HYDROCHLORIDE 100 MG/1
200 TABLET, FILM COATED ORAL DAILY
Qty: 180 TABLET | Refills: 1 | Status: SHIPPED | OUTPATIENT
Start: 2022-12-28

## 2022-12-28 RX ORDER — QUINAPRIL 10 MG/1
TABLET ORAL
Qty: 90 TABLET | Refills: 1 | Status: SHIPPED | OUTPATIENT
Start: 2022-12-28

## 2022-12-28 RX ORDER — IBUPROFEN 800 MG/1
TABLET ORAL
Qty: 120 TABLET | Refills: 0 | Status: SHIPPED | OUTPATIENT
Start: 2022-12-28

## 2022-12-28 RX ORDER — TRAZODONE HYDROCHLORIDE 50 MG/1
TABLET ORAL
Qty: 90 TABLET | Refills: 1 | Status: SHIPPED | OUTPATIENT
Start: 2022-12-28

## 2022-12-28 RX ORDER — LORATADINE 10 MG/1
10 TABLET ORAL DAILY
Qty: 30 TABLET | Refills: 1 | Status: SHIPPED | OUTPATIENT
Start: 2022-12-28

## 2022-12-28 RX ORDER — SPIRONOLACTONE 50 MG/1
TABLET, FILM COATED ORAL
Qty: 180 TABLET | Refills: 1 | Status: SHIPPED | OUTPATIENT
Start: 2022-12-28

## 2022-12-28 RX ORDER — HYDROCHLOROTHIAZIDE 12.5 MG/1
12.5 CAPSULE, GELATIN COATED ORAL EVERY MORNING
Qty: 90 CAPSULE | Refills: 1 | Status: SHIPPED | OUTPATIENT
Start: 2022-12-28

## 2022-12-28 RX ORDER — ZIPRASIDONE HYDROCHLORIDE 60 MG/1
CAPSULE ORAL
Qty: 60 CAPSULE | Refills: 5 | Status: SHIPPED | OUTPATIENT
Start: 2022-12-28

## 2022-12-28 RX ORDER — TOPIRAMATE 100 MG/1
TABLET, FILM COATED ORAL
Qty: 180 TABLET | Refills: 1 | Status: SHIPPED | OUTPATIENT
Start: 2022-12-28

## 2022-12-28 ASSESSMENT — PATIENT HEALTH QUESTIONNAIRE - PHQ9
6. FEELING BAD ABOUT YOURSELF - OR THAT YOU ARE A FAILURE OR HAVE LET YOURSELF OR YOUR FAMILY DOWN: 1
SUM OF ALL RESPONSES TO PHQ QUESTIONS 1-9: 10
7. TROUBLE CONCENTRATING ON THINGS, SUCH AS READING THE NEWSPAPER OR WATCHING TELEVISION: 3
SUM OF ALL RESPONSES TO PHQ QUESTIONS 1-9: 10
10. IF YOU CHECKED OFF ANY PROBLEMS, HOW DIFFICULT HAVE THESE PROBLEMS MADE IT FOR YOU TO DO YOUR WORK, TAKE CARE OF THINGS AT HOME, OR GET ALONG WITH OTHER PEOPLE: 1
4. FEELING TIRED OR HAVING LITTLE ENERGY: 1
9. THOUGHTS THAT YOU WOULD BE BETTER OFF DEAD, OR OF HURTING YOURSELF: 0
SUM OF ALL RESPONSES TO PHQ QUESTIONS 1-9: 10
SUM OF ALL RESPONSES TO PHQ9 QUESTIONS 1 & 2: 1
SUM OF ALL RESPONSES TO PHQ QUESTIONS 1-9: 10
1. LITTLE INTEREST OR PLEASURE IN DOING THINGS: 0
8. MOVING OR SPEAKING SO SLOWLY THAT OTHER PEOPLE COULD HAVE NOTICED. OR THE OPPOSITE, BEING SO FIGETY OR RESTLESS THAT YOU HAVE BEEN MOVING AROUND A LOT MORE THAN USUAL: 3
2. FEELING DOWN, DEPRESSED OR HOPELESS: 1
3. TROUBLE FALLING OR STAYING ASLEEP: 0
5. POOR APPETITE OR OVEREATING: 1

## 2022-12-28 ASSESSMENT — LIFESTYLE VARIABLES
HOW MANY STANDARD DRINKS CONTAINING ALCOHOL DO YOU HAVE ON A TYPICAL DAY: 1 OR 2
HOW OFTEN DO YOU HAVE A DRINK CONTAINING ALCOHOL: MONTHLY OR LESS

## 2022-12-28 NOTE — PROGRESS NOTES
Medicare Annual Wellness Visit    Danie Jj is here for Medicare AWV    Assessment & Plan   Essential hypertension  Bp at goal < 140/90  Low salt diet  Ck labs  Meds refill, pt with ck with pharm to be sure no recall on quinapril  -     quinapril (ACCUPRIL) 10 MG tablet; TAKE 1 TABLET BY MOUTH EVERY NIGHT, Disp-90 tablet, R-1Normal  -     spironolactone (ALDACTONE) 50 MG tablet; TAKE 1 TABLET BY MOUTH TWICE DAILY, Disp-180 tablet, R-1Normal  -     hydroCHLOROthiazide (MICROZIDE) 12.5 MG capsule; Take 1 capsule by mouth every morning, Disp-90 capsule, R-1Normal  -     Basic Metabolic Panel; Future  -     CBC; Future  Migraine with aura and without status migrainosus, not intractable  No current sxs  Generally no migraines as long as she takes topamax  -     topiramate (TOPAMAX) 100 MG tablet; TAKE 1 TABLET BY MOUTH TWICE DAILY, Disp-180 tablet, R-1Normal  Lumbar disc disease with radiculopathy  No bladder or bowel issues, no gi upset  -     ibuprofen (ADVIL;MOTRIN) 800 MG tablet; One tablet 3 times a day as needed, Disp-120 tablet, R-0Normal  Hirsuties  Less facial hair  Remains on   -     spironolactone (ALDACTONE) 50 MG tablet; TAKE 1 TABLET BY MOUTH TWICE DAILY, Disp-180 tablet, R-1Normal  Primary insomnia  Discussed sleep hygiene   improved  -     traZODone (DESYREL) 50 MG tablet; TAKE 1 TABLET BY MOUTH EVERY NIGHT, Disp-90 tablet, R-1Normal  Major depressive disorder with single episode, in full remission (HCC)  No SI ideation  Sees Dr Daphne Arriaza   Psych  No SI  -     sertraline (ZOLOFT) 100 MG tablet; Take 2 tablets by mouth daily, Disp-180 tablet, R-1Normal  Cont with Geodon  Fu with psychiatrist  Seasonal allergic rhinitis, unspecified trigger  Has hay fever  Less runny nose, sneezing now  -     loratadine (CLARITIN) 10 MG tablet;  Take 1 tablet by mouth daily, Disp-30 tablet, R-1Normal    Recommendations for Preventive Services Due: see orders and patient instructions/AVS.  Recommended screening schedule for the next 5-10 years is provided to the patient in written form: see Patient Instructions/AVS.     No follow-ups on file. Subjective   63 y.o female fu Hypertension  ( No chest pain, headache, sob, leg edema, stroke, kidney disease ), migraine headaches now less than one a week since  Topamax started, Lumbar disc disease (less pain, no bladder/bowel issues)  Hirsuitism mostly facial hair improved with current TX. Insomnia improved  With sleep hygiene and trazodone, Depression, long term sees star and myself. No SI today. Seems happy with life. Allergic Rhinitis: Sofía Casillas is here for evaluation of possible allergic rhinitis. Patient's symptoms include itchy nose, nasal congestion, and sneezing. These symptoms are perennial with seasonal exacerbation. Current triggers include exposure to no known precipitant. The patient has been suffering from these symptoms for approximately 3 years. The patient has tried prescription antihistamines with good relief of symptoms. Immunotherapy has never been tried. The patient has never had nasal polyps. The patient has no history of asthma. The patient does not suffer from frequent sinopulmonary infections. The patient has not had sinus surgery in the past. The patient has no history of eczema. Patient's complete Health Risk Assessment and screening values have been reviewed and are found in Flowsheets. The following problems were reviewed today and where indicated follow up appointments were made and/or referrals ordered.   Hypertension  No chest pain, headache, sob, leg edema, stroke, kidney disease     Migraine headaches  None past week  Sxs controlled with topamax  Lumbar disc disease  Less pain  No bowel or bladder issues  Hirsuite   Less facial hair now  Spironolactone helps  Major depression  In remission  No suicidal ideation  Happy with self and  who is supportive  Seasonal allergies  see davidson rand          Objective   Vitals:    12/28/22 1014   BP: 118/76   Pulse: 56   Temp: 97.2 °F (36.2 °C)   TempSrc: Temporal   SpO2: 99%   Weight: 178 lb (80.7 kg)   Height: 5' 5\" (1.651 m)      Body mass index is 29.62 kg/m². Allergies   Allergen Reactions    Lactose Intolerance (Gi)      Prior to Visit Medications    Medication Sig Taking?  Authorizing Provider   quinapril (ACCUPRIL) 10 MG tablet TAKE 1 TABLET BY MOUTH EVERY NIGHT Yes Jae Dunaway MD   topiramate (TOPAMAX) 100 MG tablet TAKE 1 TABLET BY MOUTH TWICE DAILY Yes Jae Dunaway MD   loratadine (CLARITIN) 10 MG tablet Take 1 tablet by mouth daily Yes Jae Dunaway MD   ibuprofen (ADVIL;MOTRIN) 800 MG tablet One tablet 3 times a day as needed Yes Jae Dunaway MD   spironolactone (ALDACTONE) 50 MG tablet TAKE 1 TABLET BY MOUTH TWICE DAILY Yes Jae Dunaway MD   hydroCHLOROthiazide (MICROZIDE) 12.5 MG capsule Take 1 capsule by mouth every morning Yes Jae Dunaway MD   traZODone (DESYREL) 50 MG tablet TAKE 1 TABLET BY MOUTH EVERY NIGHT Yes Jae Dunaway MD   sertraline (ZOLOFT) 100 MG tablet Take 2 tablets by mouth daily Yes Jae Dunaway MD   tiZANidine (ZANAFLEX) 4 MG tablet TAKE 1 TABLET BY MOUTH THREE TIMES DAILY AS NEEDED FOR BACK PAIN OR MUSCLE SPASMS Yes Jae Dunaway MD   ziprasidone (GEODON) 60 MG capsule TAKE 1 CAPSULE BY MOUTH TWICE DAILY WITH MEALS Yes Jae Dunaway MD   Iron-Vitamins (GERITOL COMPLETE PO) Take 1 tablet by mouth daily Yes Historical Provider, MD   fluticasone (FLONASE) 50 MCG/ACT nasal spray SHAKE LIQUID AND USE 2 SPRAYS IN EACH NOSTRIL DAILY  Patient taking differently: 2 sprays by Nasal route as needed SHAKE LIQUID AND USE 2 SPRAYS IN EACH NOSTRIL DAILY AS NEEDED Yes Jae Dunaway MD       [facility] (Including outside providers/suppliers regularly involved in providing care):   Patient Care Team:  Jae Dunaway MD as PCP - General (Family Medicine)  Jae Dunaawy MD as PCP - [facility] Empaneled Provider     Reviewed and updated this visit:  Tobacco Allergies  Meds  Problems  Med Hx  Surg Hx  Soc Hx  Fam Hx

## 2022-12-28 NOTE — PATIENT INSTRUCTIONS
Learning About Mindfulness for Stress  What are mindfulness and stress? Stress is what you feel when you have to handle more than you are used to. A lot of things can cause stress. You may feel stress when you go on a job interview, take a test, or run a race. This kind of short-term stress is normal and even useful. It can help you if you need to work hard or react quickly. Stress also can last a long time. Long-term stress is caused by stressful situations or events. Examples of long-term stress include long-term health problems, ongoing problems at work, and conflicts in your family. Long-term stress can harm your health. Mindfulness is a focus only on things happening in the present moment. It's a process of purposefully paying attention to and being aware of your surroundings, your emotions, your thoughts, and how your body feels. You are aware of these things, but you aren't judging these experiences as \"good\" or \"bad. \" Mindfulness can help you learn to calm your mind and body to help you cope with illness, pain, and stress. How does mindfulness help to relieve stress? Mindfulness can help quiet your mind and relax your body. Studies show that it can help some people sleep better, feel less anxious, and bring their blood pressure down. And it's been shown to help some people live and cope better with certain health problems like heart disease, depression, chronic pain, and cancer. How do you practice mindfulness? To be mindful is to pay attention, to be present, and to be accepting. When you're mindful, you do just one thing and you pay close attention to that one thing. For example, you may sit quietly and notice your emotions or how your food tastes and smells. When you're present, you focus on the things that are happening right now. You let go of your thoughts about the past and the future. When you dwell on the past or the future, you miss moments that can heal and strengthen you.  You may miss moments like hearing a child laugh or seeing a friendly face when you think you're all alone. When you're accepting, you don't  the present moment. Instead you accept your thoughts and feelings as they come. You can practice anytime, anywhere, and in any way you choose. You can practice in many ways. Here are a few ideas:  While doing your chores, like washing the dishes, let your mind focus on what's in your hand. What does the dish feel like? Is the water warm or cold? Go outside and take a few deep breaths. What is the air like? Is it warm or cold? When you can, take some time at the start of your day to sit alone and think. Take a slow walk by yourself. Count your steps while you breathe in and out. Try yoga breathing exercises, stretches, and poses to strengthen and relax your muscles. At work, if you can, try to stop for a few moments each hour. Note how your body feels. Let yourself regroup and let your mind settle before you return to what you were doing. If you struggle with anxiety or \"worry thoughts,\" imagine your mind as a blue ermelinda and your worry thoughts as clouds. Now imagine those worry thoughts floating across your mind's ermelinda. Just let them pass by as you watch. Follow-up care is a key part of your treatment and safety. Be sure to make and go to all appointments, and call your doctor if you are having problems. It's also a good idea to know your test results and keep a list of the medicines you take. Where can you learn more? Go to http://www.arteaga.com/ and enter M676 to learn more about \"Learning About Mindfulness for Stress. \"  Current as of: February 9, 2022               Content Version: 13.5  © 2006-2022 Healthwise, Incorporated. Care instructions adapted under license by [facility] [employer] [facility] ([facility]).  If you have questions about a medical condition or this instruction, always ask your healthcare professional. [address] any warranty or liability for messages. You can exchange stories, vent your frustrations, and ask and answer questions. Contact a city, state, or national group that provides support for your health concerns. Your library or community center may have a list of these groups. Or you can look for information online. Look for a support group that works for you. Ask yourself if you prefer structure and would like a , or if you would like a less formal group. Do you prefer face-to-face meetings? Or do you feel more secure in online chat rooms or forums? Supportive relationships  A supportive relationship includes emotional support such as love, trust, and understanding, as well as advice and concrete help, such as help managing your time. Reach out to others  Family and friends can help you. Ask them to:  Listen to you and give you encouragement. This can keep you from feeling hopeless or alone. Help with small daily tasks or with bigger problems. A helping hand can keep you from feeling overwhelmed. Help you manage a health problem. For example, ask them to go to doctor visits with you. Your loved ones can offer support by being involved in your medical care. Respect your relationships  A good relationship is also a two-way street. You count on help from others, but they also count on you. Know your friends' limits. You don't have to see or call your friends every day. If you are going through a rough patch, ask friends if you can contact them outside of the usual boundaries. Don't always complain or talk about yourself. Know when it's time to stop talking and listen or just enjoy your friend's company. Know that good friends can be a bad influence. For example, if a friend encourages you to drink when you know it will harm you, you may want to end the friendship. Where can you learn more? Go to http://www.woods.com/ and enter G092 to learn more about \"Learning About Emotional Support. \"  Current as of: February 9, 2022               Content Version: 13.5  © 4487-8938 [employer]. Care instructions adapted under license by [facility] ([facility]). If you have questions about a medical condition or this instruction, always ask your healthcare professional. [address] any warranty or liability for your use of this information. Fatigue: Care Instructions  Your Care Instructions     Fatigue is a feeling of tiredness, exhaustion, or lack of energy. You may feel fatigue because of too much or not enough activity. It can also come from stress, lack of sleep, boredom, and poor diet. Many medical problems, such as viral infections, can cause fatigue. Emotional problems, especially depression, are often the cause of fatigue. Fatigue is most often a symptom of another problem. Treatment for fatigue depends on the cause. For example, if you have fatigue because you have a certain health problem, treating this problem also treats your fatigue. If depression or anxiety is the cause, treatment may help. Follow-up care is a key part of your treatment and safety. Be sure to make and go to all appointments, and call your doctor if you are having problems. It's also a good idea to know your test results and keep a list of the medicines you take. How can you care for yourself at home? Get regular exercise. But don't overdo it. Go back and forth between rest and exercise. Get plenty of rest.  Eat a healthy diet. Do not skip meals, especially breakfast.  Reduce your use of caffeine, tobacco, and alcohol. Caffeine is most often found in coffee, tea, cola drinks, and chocolate. Limit medicines that can cause fatigue. This includes tranquilizers and cold and allergy medicines. When should you call for help?   Watch closely for changes in your health, and be sure to contact your doctor if:    You have new symptoms such as fever or a rash.     Your fatigue gets worse.     You have been feeling down, depressed, or hopeless. Or you may have lost interest in things that you usually enjoy.     You are not getting better as expected. Where can you learn more? Go to http://www.woods.com/ and enter F184 to learn more about \"Fatigue: Care Instructions. \"  Current as of: February 9, 2022               Content Version: 13.5  © 2006-2022 [employer]. Care instructions adapted under license by [facility] ([facility]). If you have questions about a medical condition or this instruction, always ask your healthcare professional. [address] any warranty or liability for your use of this information. Learning About Emotional Support  When do you need emotional support? You might find getting support from others helpful when you have a long-term health problem. Often people feel alone, confused, or scared when coping with an illness. But you aren't alone. Other people are going through the same thing you are and know how you feel. Talking with others about your feelings can help you feel better. Your family and friends can give you support. So can your doctor, a support group, or a Jewish. If you have a support network, you will not feel as alone. You will learn new ways to deal with your situation, and you may try harder to overcome it. Where you can get support  Family and friends: They can help you cope by giving you comfort and encouragement. Counseling: Professional counseling can help you cope with situations that interfere with your life and cause stress. Counseling can help you understand and deal with your illness. Your doctor: Find a doctor you trust and feel comfortable with. Be open and honest about your fears and concerns. Your doctor can help you get the right medical treatments, including counseling. Spiritual or Quaker groups: They can provide comfort and may be able to help you find counseling or other social support services.   Social groups: They can help you meet new people and get involved in activities you enjoy. Community support groups: In a support group, you can talk to others who have dealt with the same problems or illness as you. You can encourage one another and learn ways to cope with tough emotions. How to find a support group  Ask your doctor, counselor, or other health professional for suggestions. Contact your local Orthodox, Episcopal, Pentecostalism, or other Alevism group. Ask your family and friends. Ask people who have the same health concerns. Go online. Forums and blogs let you read messages from others and leave your own messages. You can exchange stories, vent your frustrations, and ask and answer questions. Contact a city, state, or national group that provides support for your health concerns. Your library or community center may have a list of these groups. Or you can look for information online. Look for a support group that works for you. Ask yourself if you prefer structure and would like a , or if you would like a less formal group. Do you prefer face-to-face meetings? Or do you feel more secure in online chat rooms or forums? Supportive relationships  A supportive relationship includes emotional support such as love, trust, and understanding, as well as advice and concrete help, such as help managing your time. Reach out to others  Family and friends can help you. Ask them to:  Listen to you and give you encouragement. This can keep you from feeling hopeless or alone. Help with small daily tasks or with bigger problems. A helping hand can keep you from feeling overwhelmed. Help you manage a health problem. For example, ask them to go to doctor visits with you. Your loved ones can offer support by being involved in your medical care. Respect your relationships  A good relationship is also a two-way street. You count on help from others, but they also count on you. Know your friends' limits.  You don't you breathe faster, and give you a burst of energy. This is called the fight-or-flight stress response. If the stress is over quickly, your body goes back to normal and no harm is done. But if stress happens too often or lasts too long, it can have bad effects. Long-term stress can make you more likely to get sick, and it can make symptoms of some diseases worse. If you tense up when you are stressed, you may develop neck, shoulder, or low back pain. Stress is linked to high blood pressure and heart disease. Stress also harms your emotional health. It can make you heart, tense, or depressed. Your relationships may suffer, and you may not do well at work or school. What can you do to manage stress? How to relax your mind   Write. It may help to write about things that are bothering you. This helps you find out how much stress you feel and what is causing it. When you know this, you can find better ways to cope. Let your feelings out. Talk, laugh, cry, and express anger when you need to. Talking with friends, family, a counselor, or a member of the clergy about your feelings is a healthy way to relieve stress. Do something you enjoy. For example, listen to music or go to a movie. Practice your hobby or do volunteer work. Meditate. This can help you relax, because you are not worrying about what happened before or what may happen in the future. Do guided imagery. Imagine yourself in any setting that helps you feel calm. You can use audiotapes, books, or a teacher to guide you. How to relax your body   Do something active. Exercise or activity can help reduce stress. Walking is a great way to get started. Even everyday activities such as housecleaning or yard work can help. Do breathing exercises. For example:  From a standing position, bend forward from the waist with your knees slightly bent. Let your arms dangle close to the floor. Breathe in slowly and deeply as you return to a standing position.  Roll up slowly and lift your head last.  Hold your breath for just a few seconds in the standing position. Breathe out slowly and bend forward from the waist.  Try yoga or dillon chi. These techniques combine exercise and meditation. You may need some training at first to learn them. What can you do to prevent stress? Manage your time. This helps you find time to do the things you want and need to do. Get enough sleep. Your body recovers from the stresses of the day while you are sleeping. Get support. Your family, friends, and community can make a difference in how you experience stress. Where can you learn more? Go to http://www.arteaga.com/ and enter N032 to learn more about \"Learning About Stress. \"  Current as of: October 6, 2021               Content Version: 13.5  © 4950-4766 [employer]. Care instructions adapted under license by [facility] ([facility]). If you have questions about a medical condition or this instruction, always ask your healthcare professional. [place] [address] any warranty or liability for your use of this information. Learning About Managing Anger  What causes anger? Many things can cause anger: Stress at work or at home. Social situations that make you angry. A response to everyday events. Anger signals your body to prepare for a fight. This reaction is often called \"fight or flight. \" When you get angry, adrenaline and other hormones are released into your blood. Then your blood pressure goes up, your heart beats faster, and you breathe faster. When you express anger in a healthy way, it can inspire change and make you productive. But if you don't have the skills to express anger in a healthy way, anger can build up. You may hurt others--and yourself--emotionally and even physically. Violent behavior often starts with verbal threats or fairly minor incidents. But over time, it can involve physical harm.  It can include physical, verbal, or sexual abuse of an intimate partner (domestic violence), a child (child abuse), or an older adult (elder abuse). It can also make you sick. Anger and constant hostility keep your blood pressure high. They increase your chances of having another health problem, such as depression, a heart attack, or a stroke. Some people with post-traumatic stress disorder (PTSD) feel angry and on alert all the time. It may feel like there are no other ways to react when you are angry. But when you learn to work with anger in appropriate and healthy ways, your anger no longer controls you. How can you manage your anger? The first step to managing anger is to be more aware of it. Note the thoughts, feelings, and emotions that you have when you get angry. Practice noticing these signs of anger when you are calm. If you are more aware of the signs of anger, you can take steps to manage it. Here are a few tips: Think before you act. Take time to stop and cool down when you feel yourself getting angry. Count to 10 while you take slow, steady breaths. Practice some other form of mental relaxation. Learn the feelings that lead to angry outbursts. Anger and hostility may be a symptom of unhappy feelings or depression about your job, your relationship, or other aspects of your personal life. Avoid situations that lead to angry outbursts. If standing in line bothers you, do errands at less busy times. Express anger in a healthy way. You might:  Go for a short walk or jog. Draw, paint, or listen to music to release the anger. Write in a daily journal.  Use \"I\" statements, not \"you\" statements, to discuss your anger. Say \"I don't feel valued when my needs are not being met\" instead of \"You make me mad when you are so inconsiderate. \"  Take care of yourself. Exercise regularly. Eat a variety of healthy foods. Don't skip meals. Try to get 8 hours of sleep each night. Limit your use of alcohol, and don't use drugs.   Practice yoga, meditation, or dillon chi to relax. Explore other resources that may be available through your job or your community. Contact your human resources department at work. You might be able to get services through an employee assistance program.  Contact your local hospital, mental health facility, or health department. Ask what types of programs or support groups are available in your area. Do not keep guns in your home. If you must have guns in your home, unload them and lock them up. Lock ammunition in a separate place. Keep guns away from children. Where can you find help? If anger or stress starts to harm your work or personal relationships, you might seek help. You can learn ways to manage your feelings and actions. Talk to someone you trust, or find a counselor. There are groups in your area that can connect you with people to talk to. [facility] . This service from the national Substance Abuse and [address] can help you find local counselors. Search online at Digiting. GiftLaunchera.gov or call 0-974-302RevolvHELP ([phone]), or Yotta280 5-890.332.2368. Parents Anonymous. Self-help groups that serve parents under stress, as well as children who have been abused, are available throughout the [facility], [facility] ([facility]), and [facility]. To find a group in your area, search online or in your phone book under Parents Anonymous or call (387) [phone]. Where can you learn more? Go to http://www.arteaga.com/ and enter Z357 to learn more about \"Learning About Managing Anger. \"  Current as of: February 9, 2022               Content Version: 13.5  © 2272-3032 Healthwise, Incorporated. Care instructions adapted under license by [facility] ([facility]). If you have questions about a medical condition or this instruction, always ask your healthcare professional. [place] [address] any warranty or liability for your use of this information.            Learning About Vision Tests  What are vision tests? The four most common vision tests are visual acuity tests, refraction, visual field tests, and color vision tests. Visual acuity (sharpness) tests  These tests are used: To see if you need glasses or contact lenses. To monitor an eye problem. To check an eye injury. Visual acuity tests are done as part of routine exams. You may also have this test when you get your 's license or apply for some types of jobs. Visual field tests  These tests are used: To check for vision loss in any area of your range of vision. To screen for certain eye diseases. To look for nerve damage after a stroke, head injury, or other problem that could reduce blood flow to the brain. Refraction and color tests  A refraction test is done to find the right prescription for glasses and contact lenses. A color vision test is done to check for color blindness. Color vision is often tested as part of a routine exam. You may also have this test when you apply for a job where recognizing different colors is important, such as , electronics, or the [employer]. How are vision tests done? Visual acuity test   You cover one eye at a time. You read aloud from a wall chart across the room. You read aloud from a small card that you hold in your hand. Refraction   You look into a special device. The device puts lenses of different strengths in front of each eye to see how strong your glasses or contact lenses need to be. Visual field tests   Your doctor may have you look through special machines. Or your doctor may simply have you stare straight ahead while they move a finger into and out of your field of vision. Color vision test   You look at pieces of printed test patterns in various colors. You say what number or symbol you see. Your doctor may have you trace the number or symbol using a pointer. How do these tests feel?   There is very little chance of having a problem from this test. If dilating drops are used for a vision test, they may make the eyes sting and cause a medicine taste in the mouth. Follow-up care is a key part of your treatment and safety. Be sure to make and go to all appointments, and call your doctor if you are having problems. It's also a good idea to know your test results and keep a list of the medicines you take. Where can you learn more? Go to http://www.arteaga.com/ and enter G551 to learn more about \"Learning About Vision Tests. \"  Current as of: October 12, 2022               Content Version: 13.5  © 1158-3154 [employer]. Care instructions adapted under license by [facility] ([facility]). If you have questions about a medical condition or this instruction, always ask your healthcare professional. [address] any warranty or liability for your use of this information. Learning About Activities of Daily Living  What are activities of daily living? Activities of daily living (ADLs) are the basic self-care tasks you do every day. As you age, and if you have health problems, you may find that it's harder to do these things for yourself. That's when you may need some help. Your doctor uses ADLs to measure how much help you need. Knowing what you can and can't do for yourself is an important first step to getting help. And when you have the help you need, you can stay as independent as possible. Your doctor will want to know if you are able to do tasks such as: Take a bath or shower without help. Go to the bathroom by yourself. Dress and undress without help. Shave, comb your hair, and brush teeth on your own. Get in and out of bed or a chair without help. Feed yourself without help. If you are having trouble doing basic self-care tasks, talk with your doctor. You may want to bring a caregiver or family member who can help the doctor understand your needs and abilities.   How will a doctor assess your ADLs?  Asking about ADLs is part of a routine health checkup your doctor will likely do as you age. Your health check might be done in a doctor's office, in your home, or at a hospital. The goal is to find out if you are having any problems that could make your health problems worse or that make it unsafe for you to be on your own. To measure your ADLs, your doctor will ask how hard it is for you to do routine tasks. He or she may also want to know if you have changed the way you do a task because of a health problem. He or she may watch how you:  Walk back and forth. Keep your balance while you stand or walk. Move from sitting to standing or from a bed to a chair. Button or unbutton a shirt or sweater. Remove and put on your shoes. It's normal to feel a little worried or anxious if you find you can't do all the things you used to be able to do. Talking with your doctor about ADLs isn't a test that you either pass or fail. It's just a way to get more information about your health and safety. Follow-up care is a key part of your treatment and safety. Be sure to make and go to all appointments, and call your doctor if you are having problems. It's also a good idea to know your test results and keep a list of the medicines you take. Current as of: October 6, 2021               Content Version: 13.5  © 2006-2022 Healthwise, Incorporated. Care instructions adapted under license by [facility] ([facility]). If you have questions about a medical condition or this instruction, always ask your healthcare professional. [place] [address] any warranty or liability for your use of this information. Advance Directives: Care Instructions  Overview  An advance directive is a legal way to state your wishes at the end of your life. It tells your family and your doctor what to do if you can't say what you want. There are two main types of advance directives.  You can change them any time your wishes change. Living will. This form tells your family and your doctor your wishes about life support and other treatment. The form is also called a declaration. Medical power of . This form lets you name a person to make treatment decisions for you when you can't speak for yourself. This person is called a health care agent (health care proxy, health care surrogate). The form is also called a durable power of  for health care. If you do not have an advance directive, decisions about your medical care may be made by a family member, or by a doctor or a  who doesn't know you. It may help to think of an advance directive as a gift to the people who care for you. If you have one, they won't have to make tough decisions by themselves. For more information, including forms for your state, see the [address] website (www.caringinfo.org/planning/advance-directives/). Follow-up care is a key part of your treatment and safety. Be sure to make and go to all appointments, and call your doctor if you are having problems. It's also a good idea to know your test results and keep a list of the medicines you take. What should you include in an advance directive? Many states have a unique advance directive form. (It may ask you to address specific issues.) Or you might use a universal form that's approved by many states. If your form doesn't tell you what to address, it may be hard to know what to include in your advance directive. Use the questions below to help you get started. Who do you want to make decisions about your medical care if you are not able to? What life-support measures do you want if you have a serious illness that gets worse over time or can't be cured? What are you most afraid of that might happen? (Maybe you're afraid of having pain, losing your independence, or being kept alive by machines.)  Where would you prefer to die? (Your home?  A hospital? A nursing home?)  Do you want to donate your organs when you die? Do you want certain Orthodoxy practices performed before you die? When should you call for help? Be sure to contact your doctor if you have any questions. Where can you learn more? Go to http://www.arteaga.com/ and enter R264 to learn more about \"Advance Directives: Care Instructions. \"  Current as of: June 16, 2022               Content Version: 13.5  © 7686-0453 [employer]. Care instructions adapted under license by [facility] ([facility]). If you have questions about a medical condition or this instruction, always ask your healthcare professional. [place] [address] any warranty or liability for your use of this information. Personalized Preventive Plan for Blease Schaumann - 12/28/2022  Medicare offers a range of preventive health benefits. Some of the tests and screenings are paid in full while other may be subject to a deductible, co-insurance, and/or copay. Some of these benefits include a comprehensive review of your medical history including lifestyle, illnesses that may run in your family, and various assessments and screenings as appropriate. After reviewing your medical record and screening and assessments performed today your provider may have ordered immunizations, labs, imaging, and/or referrals for you. A list of these orders (if applicable) as well as your Preventive Care list are included within your After Visit Summary for your review. Other Preventive Recommendations:    A preventive eye exam performed by an eye specialist is recommended every 1-2 years to screen for glaucoma; cataracts, macular degeneration, and other eye disorders. A preventive dental visit is recommended every 6 months. Try to get at least 150 minutes of exercise per week or 10,000 steps per day on a pedometer . Order or download the FREE \"Exercise & Physical Activity: Your Everyday Guide\" from The [employer].  Call 1-453.541.5471 or search The [employer] on [address] [place] [address]. You need 6646-1273 mg of calcium and 5012-8227 IU of vitamin D per day. It is possible to meet your calcium requirement with diet alone, but a vitamin D supplement is usually necessary to meet this goal.  When exposed to the sun, use a sunscreen that protects against both UVA and UVB radiation with an SPF of 30 or greater. Reapply every 2 to 3 hours or after sweating, drying off with a towel, or swimming. Always wear a seat belt when traveling in a car. Always wear a helmet when riding a bicycle or motorcycle.

## 2022-12-29 ENCOUNTER — CLINICAL DOCUMENTATION (OUTPATIENT)
Dept: SPIRITUAL SERVICES | Age: 62
End: 2022-12-29

## 2022-12-29 NOTE — ACP (ADVANCE CARE PLANNING)
Advance Care Planning   Ambulatory ACP Specialist Patient Outreach    Date:  12/29/2022  ACP Specialist:  Dolores Barcenas    Outreach call to patient in follow-up to ACP Specialist referral from: Corinne Brown MD    [x] PCP  [] Provider   [] Ambulatory Care Management [] Other for Reason:    [x] Advance Directive Assistance  [] Code Status Discussion  [] Complete Portable DNR Order  [] Discuss Goals of Care  [] Complete POST/MOST  [] Early ACP Decision-Making  [] Other    Date Referral Received:12/28/22    Today's Outreach:  [x] First   [] Second  [] Third                               First outreach made by [x]  phone  [] email []   [employer]     Intervention:  [] Spoke with Patient  [x] Left VM requesting return call      Outcome: Left detailed VM for Patient to return call. Next Step:   [] ACP scheduled conversation  [x] Outreach again in one week               [] Email / Mail ACP Info Sheets  [] Email / Mail Advance Directive            [] Close Referral. Routing closure to referring provider/staff and to ACP Specialist . [] Closure Letter mailed to Patient with Invitation to Contact ACP Specialist if/when ready.     Thank you for this referral.

## 2023-01-05 ENCOUNTER — CLINICAL DOCUMENTATION (OUTPATIENT)
Dept: SPIRITUAL SERVICES | Age: 63
End: 2023-01-05

## 2023-01-05 NOTE — ACP (ADVANCE CARE PLANNING)
Advance Care Planning   Ambulatory ACP Specialist Patient Outreach    Date:  1/5/2023  ACP Specialist:  Alise Mckeon    Outreach call to patient in follow-up to ACP Specialist referral from: Dougie Martinez MD    [x] PCP  [] Provider   [] Ambulatory Care Management [] Other for Reason:    [x] Advance Directive Assistance  [] Code Status Discussion  [] Complete Portable DNR Order  [] Discuss Goals of Care  [] Complete POST/MOST  [] Early ACP Decision-Making  [] Other    Date Referral Received: 12/28/22    Today's Outreach:  [] First   [x] Second  [] Third                               Second outreach made by [x]  phone  [] email []   [employer]     Intervention:  [] Spoke with Patient  [x] Left VM requesting return call      Outcome: Left detailed VM for Patient to return call. Patient does not have Email on file. Next Step:   [] ACP scheduled conversation  [x] Outreach again in one week               [] Email / Mail ACP Info Sheets  [] Email / Mail Advance Directive            [] Close Referral. Routing closure to referring provider/staff and to ACP Specialist . [] Closure Letter mailed to Patient with Invitation to Contact ACP Specialist if/when ready.     Thank you for this referral.

## 2023-01-25 ENCOUNTER — CLINICAL DOCUMENTATION (OUTPATIENT)
Dept: SPIRITUAL SERVICES | Age: 63
End: 2023-01-25

## 2023-01-25 NOTE — ACP (ADVANCE CARE PLANNING)
Advance Care Planning   Ambulatory ACP Specialist Patient Outreach    Date:  1/25/2023  ACP Specialist:  BETI Packer    Outreach call to patient in follow-up to ACP Specialist referral from: Aileen Caal MD    [x] PCP  [] Provider   [] Ambulatory Care Management [] Other for Reason:    [x] Advance Directive Assistance  [] Code Status Discussion  [] Complete Portable DNR Order  [] Discuss Goals of Care  [] Complete POST/MOST  [] Early ACP Decision-Making  [] Other    Date Referral Received: 12/28/2022    Today's Outreach:  [] First   [] Second  [] Third      [x] Reminder call        Third outreach made by []  phone  [] email []   [employer]     Intervention:  [] Spoke with Patient  [x] Left VM requesting return call      Outcome: ACP specialist made a reminder call to pt for her and her husbands joint ACP conversation appt for tomorrow 01/26 at 11am. Pt did not answer this call, which resulted in a vm left encouraging this pt to return this call if they would need to cancel/reschedule this appt. Next Step:   [] ACP scheduled conversation  [] Outreach again in one week               [] Email / Mail ACP Info Sheets  [] Email / Mail Advance Directive            [] Close Referral. Routing closure to referring provider/staff and to ACP Specialist . [] Closure Letter mailed to Patient with Invitation to Contact ACP Specialist if/when ready.     Thank you for this referral.

## 2023-01-26 ENCOUNTER — CLINICAL DOCUMENTATION (OUTPATIENT)
Dept: SPIRITUAL SERVICES | Age: 63
End: 2023-01-26

## 2023-01-26 NOTE — ACP (ADVANCE CARE PLANNING)
Advance Care Planning     Advance Care Planning Clinical Specialist  Conversation Note      Date of ACP Conversation: 1/26/2023    Conversation Conducted with: Patient with Decision Making Capacity    ACP Clinical Specialist: BETI Locke  Healthcare Decision Maker:     Current Designated Healthcare Decision Maker:     Primary Decision Maker: Edie N Pravin  - [phone]    Secondary Decision Maker: Lina Irene - Brother/Sister - [phone]    Supplemental (Other) Decision Maker: ISRAJJSHIRA,[facility] - Other - [phone]  Click here to complete Healthcare Decision Makers including section of the Healthcare Decision Maker Relationship (ie \"Primary\")  Today we completed the ACP conversation, nomination of HCDMs and completion of the AD docs. Care Preferences    Hospitalization: \"If your health worsens and it becomes clear that your chance of recovery is unlikely, what would your preference be regarding hospitalization? \"    Choice:  [x] The patient wants hospitalization. [] The patient prefers comfort-focused treatment without hospitalization. Ventilation: \"If you were in your present state of health and suddenly became very ill and were unable to breathe on your own, what would your preference be about the use of a ventilator (breathing machine) if it were available to you? \"      If the patient would desire the use of ventilator (breathing machine), answer \"yes\". If not, \"no\": yes    \"If your health worsens and it becomes clear that your chance of recovery is unlikely, what would your preference be about the use of a ventilator (breathing machine) if it were available to you? \"     Would the patient desire the use of ventilator (breathing machine)?: No      Resuscitation  \"CPR works best to restart the heart when there is a sudden event, like a heart attack, in someone who is otherwise healthy.  Unfortunately, CPR does not typically restart the heart for people who have serious health conditions or who are very sick. \"    \"In the event your heart stopped as a result of an underlying serious health condition, would you want attempts to be made to restart your heart (answer \"yes\" for attempt to resuscitate) or would you prefer a natural death (answer \"no\" for do not attempt to resuscitate)? \" no       [x] Yes   [] No   Educated Patient / Alexx Held regarding differences between Advance Directives and portable DNR orders. Length of ACP Conversation in minutes:  45 mins    Conversation Outcomes:  [x] ACP discussion completed  [] Existing advance directive reviewed with patient; no changes to patient's previously recorded wishes  [x] New Advance Directive completed  [] Portable Do Not Rescitate prepared for Provider review and signature  [] POLST/POST/MOLST/MOST prepared for Provider review and signature      Follow-up plan:    [] Schedule follow-up conversation to continue planning  [] Referred individual to Provider for additional questions/concerns   [x] Advised patient/agent/surrogate to review completed ACP document and update if needed with changes in condition, patient preferences or care setting    [x] This note routed to one or more involved healthcare providers    ACP conversation has been completed. Pt and pt's spouse were both present for this conversation via speaker phone. Pt was able to make her wants/needs known, along with nominating pt's HCDM's. Pt has selected her spouse Marie Martínez as her primary agent, her sister John Pandey as her secondary agent and her friend Rick Hawkins as her supplemental agent. Pt completed these AD by hand during this conversation. Pt states that she will get these AD notarized and will provide the office with a copy at her next appt. No further assistance is needed from ACP at this time, the referral can be closed.

## 2023-03-06 ENCOUNTER — TELEPHONE (OUTPATIENT)
Dept: PRIMARY CARE CLINIC | Age: 63
End: 2023-03-06

## 2023-03-06 NOTE — TELEPHONE ENCOUNTER
PT called in wanting to know if Dr. Nadia Aguirre has received a fax from her insurance regarding the prescription for Geodon that Dr. Nadia Aguirre prescribed for her. She says the cost is $50+/mo & if Dr. Nadia Aguirre has received this fax she says it needs to be faxed back to her insurance company today.      Please call back: [phone]

## 2023-03-09 DIAGNOSIS — M51.16 LUMBAR DISC DISEASE WITH RADICULOPATHY: ICD-10-CM

## 2023-03-09 RX ORDER — IBUPROFEN 800 MG/1
TABLET ORAL
Qty: 120 TABLET | Refills: 4 | Status: SHIPPED | OUTPATIENT
Start: 2023-03-09

## 2023-03-29 ENCOUNTER — OFFICE VISIT (OUTPATIENT)
Dept: PRIMARY CARE CLINIC | Age: 63
End: 2023-03-29
Payer: MEDICARE

## 2023-03-29 VITALS
TEMPERATURE: 96.6 F | RESPIRATION RATE: 18 BRPM | SYSTOLIC BLOOD PRESSURE: 116 MMHG | DIASTOLIC BLOOD PRESSURE: 82 MMHG | OXYGEN SATURATION: 98 % | WEIGHT: 179.6 LBS | HEART RATE: 64 BPM | BODY MASS INDEX: 29.89 KG/M2

## 2023-03-29 DIAGNOSIS — J30.2 SEASONAL ALLERGIC RHINITIS, UNSPECIFIED TRIGGER: ICD-10-CM

## 2023-03-29 DIAGNOSIS — G43.109 MIGRAINE WITH AURA AND WITHOUT STATUS MIGRAINOSUS, NOT INTRACTABLE: ICD-10-CM

## 2023-03-29 DIAGNOSIS — L68.0 HIRSUTIES: ICD-10-CM

## 2023-03-29 DIAGNOSIS — M51.16 LUMBAR DISC DISEASE WITH RADICULOPATHY: ICD-10-CM

## 2023-03-29 DIAGNOSIS — I10 ESSENTIAL HYPERTENSION: Primary | ICD-10-CM

## 2023-03-29 DIAGNOSIS — F32.5 MAJOR DEPRESSIVE DISORDER WITH SINGLE EPISODE, IN FULL REMISSION (HCC): ICD-10-CM

## 2023-03-29 DIAGNOSIS — Z98.890 S/P COLONOSCOPY: ICD-10-CM

## 2023-03-29 DIAGNOSIS — F51.01 PRIMARY INSOMNIA: ICD-10-CM

## 2023-03-29 PROCEDURE — 3074F SYST BP LT 130 MM HG: CPT | Performed by: FAMILY MEDICINE

## 2023-03-29 PROCEDURE — 99214 OFFICE O/P EST MOD 30 MIN: CPT | Performed by: FAMILY MEDICINE

## 2023-03-29 PROCEDURE — 3079F DIAST BP 80-89 MM HG: CPT | Performed by: FAMILY MEDICINE

## 2023-03-29 RX ORDER — LORATADINE 10 MG/1
10 TABLET ORAL DAILY
Qty: 30 TABLET | Refills: 1 | Status: SHIPPED | OUTPATIENT
Start: 2023-03-29

## 2023-03-29 RX ORDER — SPIRONOLACTONE 50 MG/1
TABLET, FILM COATED ORAL
Qty: 180 TABLET | Refills: 1 | Status: SHIPPED | OUTPATIENT
Start: 2023-03-29

## 2023-03-29 RX ORDER — HYDROCHLOROTHIAZIDE 12.5 MG/1
12.5 CAPSULE, GELATIN COATED ORAL EVERY MORNING
Qty: 90 CAPSULE | Refills: 1 | Status: SHIPPED | OUTPATIENT
Start: 2023-03-29

## 2023-03-29 RX ORDER — QUINAPRIL 10 MG/1
TABLET ORAL
Qty: 90 TABLET | Refills: 1 | Status: SHIPPED | OUTPATIENT
Start: 2023-03-29

## 2023-03-29 RX ORDER — ZIPRASIDONE HYDROCHLORIDE 60 MG/1
CAPSULE ORAL
Qty: 60 CAPSULE | Refills: 5 | Status: SHIPPED | OUTPATIENT
Start: 2023-03-29

## 2023-03-29 RX ORDER — TOPIRAMATE 100 MG/1
TABLET, FILM COATED ORAL
Qty: 180 TABLET | Refills: 1 | Status: SHIPPED | OUTPATIENT
Start: 2023-03-29

## 2023-03-29 RX ORDER — TRAZODONE HYDROCHLORIDE 50 MG/1
TABLET ORAL
Qty: 90 TABLET | Refills: 1 | Status: SHIPPED | OUTPATIENT
Start: 2023-03-29

## 2023-03-29 RX ORDER — SERTRALINE HYDROCHLORIDE 100 MG/1
200 TABLET, FILM COATED ORAL DAILY
Qty: 180 TABLET | Refills: 1 | Status: SHIPPED | OUTPATIENT
Start: 2023-03-29

## 2023-03-29 RX ORDER — TIZANIDINE 4 MG/1
TABLET ORAL
Qty: 90 TABLET | Refills: 1 | Status: SHIPPED | OUTPATIENT
Start: 2023-03-29

## 2023-03-29 ASSESSMENT — PATIENT HEALTH QUESTIONNAIRE - PHQ9
4. FEELING TIRED OR HAVING LITTLE ENERGY: 1
8. MOVING OR SPEAKING SO SLOWLY THAT OTHER PEOPLE COULD HAVE NOTICED. OR THE OPPOSITE, BEING SO FIGETY OR RESTLESS THAT YOU HAVE BEEN MOVING AROUND A LOT MORE THAN USUAL: 0
SUM OF ALL RESPONSES TO PHQ QUESTIONS 1-9: 8
1. LITTLE INTEREST OR PLEASURE IN DOING THINGS: 1
SUM OF ALL RESPONSES TO PHQ QUESTIONS 1-9: 8
7. TROUBLE CONCENTRATING ON THINGS, SUCH AS READING THE NEWSPAPER OR WATCHING TELEVISION: 2
9. THOUGHTS THAT YOU WOULD BE BETTER OFF DEAD, OR OF HURTING YOURSELF: 0
6. FEELING BAD ABOUT YOURSELF - OR THAT YOU ARE A FAILURE OR HAVE LET YOURSELF OR YOUR FAMILY DOWN: 3
10. IF YOU CHECKED OFF ANY PROBLEMS, HOW DIFFICULT HAVE THESE PROBLEMS MADE IT FOR YOU TO DO YOUR WORK, TAKE CARE OF THINGS AT HOME, OR GET ALONG WITH OTHER PEOPLE: 1
3. TROUBLE FALLING OR STAYING ASLEEP: 0
5. POOR APPETITE OR OVEREATING: 0
SUM OF ALL RESPONSES TO PHQ9 QUESTIONS 1 & 2: 2
2. FEELING DOWN, DEPRESSED OR HOPELESS: 1

## 2023-03-29 NOTE — PROGRESS NOTES
Silvestre Ace (:  1960) is a 63 y.o. female,Established patient, here for evaluation of the following chief complaint(s):  Donnie Gaucher was seen today for check-up. Diagnoses and all orders for this visit:    Essential hypertension  Bp at goal < 140/90  Low salt and dash diet  Ck renal  Meds refill  -     hydroCHLOROthiazide (MICROZIDE) 12.5 MG capsule; Take 1 capsule by mouth every morning  -     spironolactone (ALDACTONE) 50 MG tablet; TAKE 1 TABLET BY MOUTH TWICE DAILY  -     quinapril (ACCUPRIL) 10 MG tablet; TAKE 1 TABLET BY MOUTH EVERY NIGHT  -     Basic Metabolic Panel; Future    Seasonal allergic rhinitis, unspecified trigger  Sxs stable/controlled  Cont with  -     loratadine (CLARITIN) 10 MG tablet; Take 1 tablet by mouth daily    Major depressive disorder with single episode, in full remission ([facility] [address]  No SI at this time  Pleasant happy with life  cont  -     sertraline (ZOLOFT) 100 MG tablet;  Take 2 tablets by mouth daily  -     ziprasidone (GEODON) 60 MG capsule; TAKE 1 CAPSULE BY MOUTH TWICE DAILY WITH MEALS    Hirsuties  Stable with  -     spironolactone (ALDACTONE) 50 MG tablet; TAKE 1 TABLET BY MOUTH TWICE DAILY    Lumbar disc disease with radiculopathy  Back pain less no bladder or bowel issues  -     tiZANidine (ZANAFLEX) 4 MG tablet; TAKE 1 TABLET BY MOUTH THREE TIMES DAILY AS NEEDED FOR BACK PAIN OR MUSCLE SPASMS    Migraine with aura and without status migrainosus, not intractable  Her headaches less than twice a week if she is compliant  With medication  -     topiramate (TOPAMAX) 100 MG tablet; TAKE 1 TABLET BY MOUTH TWICE DAILY    Primary insomnia  Sleeps about 8 hours a night  Silvestre Ace (:  1960) is a 63 y.o. female,Established patient, here for evaluation of the following chief complaint(s):  Check-Up           Subjective   SUBJECTIVE/OBJECTIVE:  HPI 3 months fu visit    Hypertension  No chest pain, headache, sob, leg edema, stroke, kidney disease

## 2023-04-03 DIAGNOSIS — I10 ESSENTIAL HYPERTENSION: ICD-10-CM

## 2023-04-03 LAB
ANION GAP SERPL CALCULATED.3IONS-SCNC: 15 MMOL/L (ref 3–16)
BUN SERPL-MCNC: 19 MG/DL (ref 7–20)
CALCIUM SERPL-MCNC: 10.6 MG/DL (ref 8.3–10.6)
CHLORIDE SERPL-SCNC: 104 MMOL/L (ref 99–110)
CO2 SERPL-SCNC: 24 MMOL/L (ref 21–32)
CREAT SERPL-MCNC: 1 MG/DL (ref 0.6–1.2)
GFR SERPLBLD CREATININE-BSD FMLA CKD-EPI: >60 ML/MIN/{1.73_M2}
GLUCOSE SERPL-MCNC: 89 MG/DL (ref 70–99)
POTASSIUM SERPL-SCNC: 3.6 MMOL/L (ref 3.5–5.1)
SODIUM SERPL-SCNC: 143 MMOL/L (ref 136–145)

## 2023-06-01 DIAGNOSIS — F32.5 MAJOR DEPRESSIVE DISORDER WITH SINGLE EPISODE, IN FULL REMISSION (HCC): ICD-10-CM

## 2023-06-01 DIAGNOSIS — L68.0 HIRSUTIES: ICD-10-CM

## 2023-06-01 DIAGNOSIS — I10 ESSENTIAL HYPERTENSION: ICD-10-CM

## 2023-06-01 RX ORDER — SPIRONOLACTONE 50 MG/1
TABLET, FILM COATED ORAL
Qty: 180 TABLET | Refills: 1 | Status: SHIPPED | OUTPATIENT
Start: 2023-06-01

## 2023-06-01 RX ORDER — SERTRALINE HYDROCHLORIDE 100 MG/1
200 TABLET, FILM COATED ORAL DAILY
Qty: 180 TABLET | Refills: 1 | Status: SHIPPED | OUTPATIENT
Start: 2023-06-01

## 2023-06-01 NOTE — TELEPHONE ENCOUNTER
Medication:   Requested Prescriptions     Pending Prescriptions Disp Refills    spironolactone (ALDACTONE) 50 MG tablet [Pharmacy Med Name: SPIRONOLACTONE 50MG TABLETS] 180 tablet 1     Sig: TAKE 1 TABLET BY MOUTH TWICE DAILY    sertraline (ZOLOFT) 100 MG tablet [Pharmacy Med Name: SERTRALINE 100MG TABLETS] 180 tablet 1     Sig: TAKE 2 TABLETS BY MOUTH DAILY        Last Filled:      Patient Phone Number: [phone] (home)     Last appt: 3/29/2023   Next appt: 9/25/2023    Last OARRS: No flowsheet data found.

## 2023-09-29 DIAGNOSIS — I10 ESSENTIAL HYPERTENSION: ICD-10-CM

## 2023-09-29 DIAGNOSIS — G43.109 MIGRAINE WITH AURA AND WITHOUT STATUS MIGRAINOSUS, NOT INTRACTABLE: ICD-10-CM

## 2023-09-29 RX ORDER — TOPIRAMATE 100 MG/1
TABLET, FILM COATED ORAL
Qty: 180 TABLET | Refills: 1 | Status: SHIPPED | OUTPATIENT
Start: 2023-09-29

## 2023-09-29 RX ORDER — QUINAPRIL 10 MG/1
TABLET ORAL
Qty: 90 TABLET | Refills: 1 | Status: SHIPPED | OUTPATIENT
Start: 2023-09-29

## 2023-09-29 NOTE — TELEPHONE ENCOUNTER
Medication:   Requested Prescriptions     Pending Prescriptions Disp Refills    topiramate (TOPAMAX) 100 MG tablet 180 tablet 1     Sig: TAKE 1 TABLET BY MOUTH TWICE DAILY        Last Filled:      Patient Phone Number: [phone] (home)     Last appt: 3/29/2023   Next appt: Visit date not found    Last OARRS:        No data to display

## 2023-11-16 ENCOUNTER — TELEPHONE (OUTPATIENT)
Dept: PRIMARY CARE CLINIC | Age: 63
End: 2023-11-16

## 2023-11-16 DIAGNOSIS — I10 ESSENTIAL HYPERTENSION: ICD-10-CM

## 2023-11-16 DIAGNOSIS — M51.16 LUMBAR DISC DISEASE WITH RADICULOPATHY: ICD-10-CM

## 2023-11-16 DIAGNOSIS — L68.0 HIRSUTIES: ICD-10-CM

## 2023-11-16 DIAGNOSIS — G43.109 MIGRAINE WITH AURA AND WITHOUT STATUS MIGRAINOSUS, NOT INTRACTABLE: ICD-10-CM

## 2023-11-16 DIAGNOSIS — F51.01 PRIMARY INSOMNIA: ICD-10-CM

## 2023-11-16 DIAGNOSIS — F32.5 MAJOR DEPRESSIVE DISORDER WITH SINGLE EPISODE, IN FULL REMISSION (HCC): ICD-10-CM

## 2023-11-16 DIAGNOSIS — J30.2 SEASONAL ALLERGIC RHINITIS, UNSPECIFIED TRIGGER: ICD-10-CM

## 2023-11-16 NOTE — TELEPHONE ENCOUNTER
Pt was advised that Dr Earl has been retired and we  have given plenty of tie for his pt' to establish care with a provider before meds run out. Pt had an appt Friday but was cancelled due to dr yani MIRANDA.  Pt was advised will send a msg to see if covering provider will fill just enough to get to appt in January, but it will be at that dr discretion. Pt then said she just needs a refill on her BP medication.

## 2023-11-16 NOTE — TELEPHONE ENCOUNTER
----- Message from Yecenia Phan sent at 11/16/2023  1:43 PM EST -----  Subject: Message to Provider    QUESTIONS  Information for Provider? Patient was returning a phone call from the   office she received today. No answer at office. She also says that she was   needing refills had an appointment for tomorrow. Someone said Provider was   out of office tomorrow. So then someone scheduled her for January. Her   appointment was cancelled in September. She said that someone called her   back and told her they cannot give her refills and she has to come in.   Frustrated that she cannot get refills when she had an appt. Please call   patient back to advise.   ---------------------------------------------------------------------------  --------------  CALL BACK INFO  [phone]; OK to leave message on voicemail  ---------------------------------------------------------------------------  --------------  SCRIPT ANSWERS  undefined

## 2023-11-16 NOTE — TELEPHONE ENCOUNTER
Patient needs refill on     hydroCHLOROthiazide (MICROZIDE) 12.5 MG capsule     topiramate (TOPAMAX) 100 MG tablet     sertraline (ZOLOFT) 100 MG tablet     ibuprofen (ADVIL;MOTRIN) 800 MG tablet     quinapril (ACCUPRIL) 10 MG tablet     ziprasidone (GEODON) 60 MG capsule     loratadine (CLARITIN) 10 MG tablet     tiZANidine (ZANAFLEX) 4 MG tablet     traZODone (DESYREL) 50 MG tablet       [facility] [address] [place] [address], [address] [place] [address] - F [phone]    Please advise pt

## 2023-11-16 NOTE — TELEPHONE ENCOUNTER
I called pt got the v/m left message for her to call back and make appt with one of the providers to est with before she can get refills last seen 3-23

## 2023-11-17 RX ORDER — ZIPRASIDONE HYDROCHLORIDE 60 MG/1
CAPSULE ORAL
Qty: 60 CAPSULE | Refills: 5 | Status: SHIPPED | OUTPATIENT
Start: 2023-11-17

## 2023-11-17 RX ORDER — LORATADINE 10 MG/1
10 TABLET ORAL DAILY
Qty: 30 TABLET | Refills: 1 | Status: SHIPPED | OUTPATIENT
Start: 2023-11-17

## 2023-11-17 RX ORDER — QUINAPRIL 10 MG/1
TABLET ORAL
Qty: 90 TABLET | Refills: 1 | Status: SHIPPED | OUTPATIENT
Start: 2023-11-17

## 2023-11-17 RX ORDER — SERTRALINE HYDROCHLORIDE 100 MG/1
200 TABLET, FILM COATED ORAL DAILY
Qty: 180 TABLET | Refills: 1 | Status: SHIPPED | OUTPATIENT
Start: 2023-11-17

## 2023-11-17 RX ORDER — TRAZODONE HYDROCHLORIDE 50 MG/1
TABLET ORAL
Qty: 90 TABLET | Refills: 1 | Status: SHIPPED | OUTPATIENT
Start: 2023-11-17

## 2023-11-17 RX ORDER — TOPIRAMATE 100 MG/1
TABLET, FILM COATED ORAL
Qty: 180 TABLET | Refills: 1 | Status: SHIPPED | OUTPATIENT
Start: 2023-11-17

## 2023-11-17 RX ORDER — TIZANIDINE 4 MG/1
TABLET ORAL
Qty: 90 TABLET | Refills: 1 | Status: SHIPPED | OUTPATIENT
Start: 2023-11-17

## 2023-11-17 RX ORDER — HYDROCHLOROTHIAZIDE 12.5 MG/1
12.5 CAPSULE, GELATIN COATED ORAL EVERY MORNING
Qty: 90 CAPSULE | Refills: 1 | Status: SHIPPED | OUTPATIENT
Start: 2023-11-17

## 2023-11-17 RX ORDER — IBUPROFEN 800 MG/1
TABLET ORAL
Qty: 120 TABLET | Refills: 4 | Status: SHIPPED | OUTPATIENT
Start: 2023-11-17

## 2023-11-17 RX ORDER — SPIRONOLACTONE 50 MG/1
50 TABLET, FILM COATED ORAL 2 TIMES DAILY
Qty: 180 TABLET | Refills: 1 | Status: SHIPPED | OUTPATIENT
Start: 2023-11-17

## 2024-06-24 NOTE — TELEPHONE ENCOUNTER
Medication:   Requested Prescriptions     Pending Prescriptions Disp Refills    hydroCHLOROthiazide (MICROZIDE) 12.5 MG capsule [Pharmacy Med Name: HYDROCHLOROTHIAZIDE 12.5MG CAPSULES] 90 capsule 1     Sig: TAKE 1 CAPSULE BY MOUTH EVERY MORNING        Last Filled:      Patient Phone Number: [phone] (home)     Last appt: 11/4/2022   Next appt: 12/28/2022    Last OARRS: No flowsheet data found. Verified name and .    Patient was advised of Dr. Kaur's message.     Patient verbalizes understanding and agrees with plan.

## 2025-03-21 NOTE — PROGRESS NOTES
Subjective:      Patient ID: Yury Morrow is a 64 y.o. female. Fu for labs  Hypertension   This is a chronic problem. The current episode started more than 1 year ago. The problem is controlled. Pertinent negatives include no blurred vision, chest pain, headaches, malaise/fatigue, neck pain, orthopnea, palpitations, peripheral edema, PND, shortness of breath or sweats. There are no associated agents to hypertension. Risk factors for coronary artery disease include post-menopausal state. Past treatments include diuretics and ACE inhibitors. The current treatment provides significant improvement. There is no history of angina, kidney disease, CAD/MI, CVA, heart failure, left ventricular hypertrophy, PVD or retinopathy. There is no history of chronic renal disease, coarctation of the aorta, hyperaldosteronism, hypercortisolism, a hypertension causing med, pheochromocytoma, renovascular disease, sleep apnea or a thyroid problem. Back Pain   This is a chronic problem. The current episode started more than 1 year ago. The problem occurs intermittently. The problem has been gradually improving since onset. The pain is present in the lumbar spine. The quality of the pain is described as aching. The pain is at a severity of 5/10. The pain is mild. The symptoms are aggravated by bending. Pertinent negatives include no abdominal pain, bladder incontinence, bowel incontinence, chest pain, dysuria, fever, headaches, leg pain, numbness, paresis, pelvic pain, perianal numbness, tingling or weakness.      63 y/o female fu for hypertension, depression, low back pain  No ha or sob or chest pain at this time    Hypertension  See hpi    Low back pain  See hpi    Major depression  Has interest I others  Enjoys life  No suicidal ideation    Over wt  Wt Readings from Last 3 Encounters:   08/19/20 192 lb (87.1 kg)   11/19/19 187 lb 9.6 oz (85.1 kg)   11/05/19 187 lb 9.6 oz (85.1 kg)       Review of Systems   Constitutional: Negative No Normocephalic and atraumatic. Right Ear: External ear normal.      Left Ear: External ear normal.      Nose: Nose normal.      Mouth/Throat:      Pharynx: No oropharyngeal exudate. Eyes:      General: No scleral icterus. Left eye: No discharge. Conjunctiva/sclera: Conjunctivae normal.      Pupils: Pupils are equal, round, and reactive to light. Neck:      Musculoskeletal: Normal range of motion and neck supple. Thyroid: No thyromegaly. Vascular: No JVD. Trachea: No tracheal deviation. Cardiovascular:      Rate and Rhythm: Normal rate and regular rhythm. Heart sounds: Normal heart sounds. No murmur. No friction rub. No gallop. Pulmonary:      Effort: Pulmonary effort is normal. No respiratory distress. Breath sounds: Normal breath sounds. No stridor. No wheezing or rales. Chest:      Chest wall: No tenderness. Abdominal:      General: Bowel sounds are normal. There is no distension. Palpations: Abdomen is soft. There is no mass. Tenderness: There is no abdominal tenderness. There is no guarding or rebound. Musculoskeletal: Normal range of motion. General: No tenderness. Lymphadenopathy:      Cervical: No cervical adenopathy. Skin:     General: Skin is warm and dry. Coloration: Skin is not pale. Findings: No erythema or rash. Neurological:      Mental Status: She is alert and oriented to person, place, and time. Cranial Nerves: No cranial nerve deficit. Coordination: Coordination normal.      Deep Tendon Reflexes: Reflexes are normal and symmetric. Reflexes normal.   Psychiatric:         Behavior: Behavior normal.         Thought Content: Thought content normal.         Judgment: Judgment normal.         Assessment:   1. Essential hypertension    - quinapril (ACCUPRIL) 10 MG tablet; Take 1 tablet by mouth nightly  Dispense: 90 tablet;  Refill: 2  - hydroCHLOROthiazide (MICROZIDE) 12.5 MG capsule; TAKE 1 CAPSULE BY MOUTH EVERY DAY  Dispense: 90 capsule; Refill: 1  - spironolactone (ALDACTONE) 50 MG tablet; TAKE ONE TABLET BY MOUTH TWICE DAILY  Dispense: 60 tablet; Refill: 5    2. Chronic bilateral low back pain, unspecified whether sciatica present    - ibuprofen (ADVIL;MOTRIN) 800 MG tablet; TAKE 1 TABLET BY MOUTH THREE TIMES DAILY AS NEEDED FOR PAIN  Dispense: 90 tablet; Refill: 3    3. Major depressive disorder with single episode, in full remission (HCC)    - sertraline (ZOLOFT) 100 MG tablet; TAKE TWO TABLETS BY MOUTH DAILY  Dispense: 60 tablet; Refill: 5    4. Overweight  Mediterranean diet  exercise    5. Need for shingles vaccine    - zoster recombinant adjuvanted vaccine [facility]) 50 MCG/0.5ML SUSR injection;  Inject 0.5 mLs into the muscle once for 1 dose 2 doses  4-6 months  Dispense: 0.5 mL; Refill: 0              Plan:              Rachelle Navarrete MD

## (undated) DEVICE — SNARE ENDOSCP AD L240CM LOOP W10MM SHTH DIA2.4MM RND INSUL

## (undated) DEVICE — ENDOSCOPY KIT: Brand: MEDLINE INDUSTRIES, INC.